# Patient Record
Sex: MALE | Race: WHITE | NOT HISPANIC OR LATINO | Employment: OTHER | URBAN - METROPOLITAN AREA
[De-identification: names, ages, dates, MRNs, and addresses within clinical notes are randomized per-mention and may not be internally consistent; named-entity substitution may affect disease eponyms.]

---

## 2017-01-03 ENCOUNTER — DOCUMENTATION (OUTPATIENT)
Dept: CARDIAC REHAB | Facility: HOSPITAL | Age: 67
End: 2017-01-03

## 2017-01-03 NOTE — PROGRESS NOTES
Pt. Referred for Phase II Cardiac Rehab. Patient to follow up with [facility] after discharge.  [facility] to refer pt back to Cardiac Rehab when appropriate.

## 2017-01-09 ENCOUNTER — LAB (OUTPATIENT)
Dept: LAB | Facility: HOSPITAL | Age: 67
End: 2017-01-09

## 2017-01-09 ENCOUNTER — OFFICE VISIT (OUTPATIENT)
Dept: CARDIOLOGY | Facility: CLINIC | Age: 67
End: 2017-01-09

## 2017-01-09 DIAGNOSIS — I25.5 CARDIOMYOPATHY, ISCHEMIC: ICD-10-CM

## 2017-01-09 DIAGNOSIS — I25.5 ISCHEMIC CARDIOMYOPATHY: ICD-10-CM

## 2017-01-09 DIAGNOSIS — I25.5 ISCHEMIC CARDIOMYOPATHY: Primary | ICD-10-CM

## 2017-01-09 LAB
ANION GAP SERPL CALCULATED.3IONS-SCNC: 4 MMOL/L (ref 3–11)
BUN BLD-MCNC: 13 MG/DL (ref 9–23)
BUN/CREAT SERPL: 13 (ref 7–25)
CALCIUM SPEC-SCNC: 9.3 MG/DL (ref 8.7–10.4)
CHLORIDE SERPL-SCNC: 106 MMOL/L (ref 99–109)
CO2 SERPL-SCNC: 35 MMOL/L (ref 20–31)
CREAT BLD-MCNC: 1 MG/DL (ref 0.6–1.3)
DEPRECATED RDW RBC AUTO: 42.7 FL (ref 37–54)
ERYTHROCYTE [DISTWIDTH] IN BLOOD BY AUTOMATED COUNT: 13.3 % (ref 11.3–14.5)
GFR SERPL CREATININE-BSD FRML MDRD: 75 ML/MIN/1.73
GLUCOSE BLD-MCNC: 69 MG/DL (ref 70–100)
HCT VFR BLD AUTO: 41 % (ref 38.9–50.9)
HGB BLD-MCNC: 13.9 G/DL (ref 13.1–17.5)
INR PPP: 0.89
MCH RBC QN AUTO: 30 PG (ref 27–31)
MCHC RBC AUTO-ENTMCNC: 33.9 G/DL (ref 32–36)
MCV RBC AUTO: 88.4 FL (ref 80–99)
PLATELET # BLD AUTO: 180 10*3/MM3 (ref 150–450)
PMV BLD AUTO: 10.1 FL (ref 6–12)
POTASSIUM BLD-SCNC: 5 MMOL/L (ref 3.5–5.5)
PROTHROMBIN TIME: 9.7 SECONDS (ref 9.6–11.5)
RBC # BLD AUTO: 4.64 10*6/MM3 (ref 4.2–5.76)
SODIUM BLD-SCNC: 145 MMOL/L (ref 132–146)
WBC NRBC COR # BLD: 13.08 10*3/MM3 (ref 3.5–10.8)

## 2017-01-09 PROCEDURE — 85610 PROTHROMBIN TIME: CPT | Performed by: PHYSICIAN ASSISTANT

## 2017-01-09 PROCEDURE — 36415 COLL VENOUS BLD VENIPUNCTURE: CPT

## 2017-01-09 PROCEDURE — 80048 BASIC METABOLIC PNL TOTAL CA: CPT | Performed by: INTERNAL MEDICINE

## 2017-01-09 PROCEDURE — 85027 COMPLETE CBC AUTOMATED: CPT | Performed by: PHYSICIAN ASSISTANT

## 2017-01-09 PROCEDURE — 99024 POSTOP FOLLOW-UP VISIT: CPT | Performed by: INTERNAL MEDICINE

## 2017-01-09 NOTE — PROGRESS NOTES
WOUND CHECK    2017    Cholo Lion, : 1950    WOUND CHECK    B/P: 118/78 (Sitting)    Pulse: 59    Patient has fever: [] Temperature if indicated: 97.8    Wound Location: Left Infraclavicular    Dressing Removed [x]        Old Dressing Appearance:  Clean, dry []                 Old, bloody drainage [x]                            Moist, serous drainage []                Moist, thick yellow/green drainage []       Wound Appearance: Redness []                  Drainage []                  Culture obtained []        Color: N/A     Consistency: N/A     Amount: none         Gloves used, wound cleansed with sterile 4x4 and peroxide [x]       MD notified [] MD orders:     Antibiotic started []  If checked, type   Other:     Appointment for follow-up scheduled for 3 months post procedure [x]    Future Appointments  Date Time Provider Department Center   5/15/2017 10:45 AM Espinoza Benjamin DO [facility] None           Nazia Leigh, 17      MD Signature:______________________________ Completed By/Date:

## 2017-03-20 ENCOUNTER — OFFICE VISIT (OUTPATIENT)
Dept: CARDIOLOGY | Facility: CLINIC | Age: 67
End: 2017-03-20

## 2017-03-20 VITALS
WEIGHT: 213.2 LBS | BODY MASS INDEX: 30.52 KG/M2 | HEART RATE: 45 BPM | HEIGHT: 70 IN | DIASTOLIC BLOOD PRESSURE: 64 MMHG | SYSTOLIC BLOOD PRESSURE: 116 MMHG

## 2017-03-20 DIAGNOSIS — I25.5 ISCHEMIC CARDIOMYOPATHY: Primary | ICD-10-CM

## 2017-03-20 DIAGNOSIS — I49.3 PVC'S (PREMATURE VENTRICULAR CONTRACTIONS): ICD-10-CM

## 2017-03-20 DIAGNOSIS — I49.3 PVC'S (PREMATURE VENTRICULAR CONTRACTIONS): Primary | ICD-10-CM

## 2017-03-20 PROCEDURE — 93289 INTERROG DEVICE EVAL HEART: CPT | Performed by: INTERNAL MEDICINE

## 2017-03-20 PROCEDURE — 99024 POSTOP FOLLOW-UP VISIT: CPT | Performed by: INTERNAL MEDICINE

## 2017-03-20 NOTE — PROGRESS NOTES
Subjective:   Cholo Lion  1950  [phone]      03/20/2017    [facility] CARDIOLOGY    Sebastian Najera MD  [address] [address]    REFERRING DOCTOR: Prabhjot Lala MD       Patient ID: Cholo Lion is a 66 y.o. male.    Chief Complaint:   Chief Complaint   Patient presents with   • Cardiomyopathy     Problem List:     1. CAD s/p previous CABG and history of stents in the past  [facility] 8/2016 [facility] Dr Lala patent LM and Lcx stent and patent LIMA graft  EF 20%  On optimal medical therapy  RBBB with  msec  Echo 12/2/2016 EF 20%  FC III symptoms on optimal medical therapy he can tolerate.      2. COPD     3. Tobacco abuse     4. HTN     5. HPL    Allergies   Allergen Reactions   • Bactrim [Sulfamethoxazole-Trimethoprim]      UNKNOWN   • Lopressor [Metoprolol Tartrate]      hypotension   • Neosporin [Neomycin-Bacitracin Zn-Polymyx] Rash       Current Outpatient Prescriptions:   •  aspirin 325 MG tablet, Take 325 mg by mouth Every Morning., Disp: , Rfl:   •  atorvastatin (LIPITOR) 20 MG tablet, Take 1 tablet by mouth daily. (Patient taking differently: Take 20 mg by mouth Every Morning.), Disp: 30 tablet, Rfl: 11  •  carvedilol (COREG) 3.125 MG tablet, Take 1 tablet by mouth every 12 (twelve) hours., Disp: 60 tablet, Rfl: 12  •  clopidogrel (PLAVIX) 75 MG tablet, Take 1 tablet by mouth daily. (Patient taking differently: Take 75 mg by mouth Every Morning.), Disp: 30 tablet, Rfl: 12  •  nitroglycerin (NITROSTAT) 0.4 MG SL tablet, Place 1 tablet under the tongue every 5 (five) minutes as needed for chest pain for up to 3 doses. Take no more than 3 doses in 15 minutes., Disp: 25 tablet, Rfl: 12  •  sacubitril-valsartan (ENTRESTO) 24-26 MG tablet, Take 1 tablet by mouth 2 (Two) Times a Day., Disp: 180 tablet, Rfl: 1  •  spironolactone (ALDACTONE) 25 MG tablet, Take 1 tablet by mouth daily. (Patient taking differently: Take 25 mg by mouth Every  "Morning.), Disp: 30 tablet, Rfl: 12    History of Present Illness    Patient presents today in follow-up s/p BIV ICD for ICM. He has not noticed any change in how he feels since implant. Still exhibiting FC III HF symptoms. He is also reporting frequent dizziness and near syncope. No issues with chest pain, fevers, chills, night sweats, PND, orthopnea or palpitations. No recent ER visits or hospital stays.  Seems to have good amount of PVCs even on physical exam.      The following portions of the patient's history were reviewed and updated as appropriate: allergies, current medications, past family history, past medical history, past social history, past surgical history and problem list.    ROS   14 point ROS negative except as outlined in problem list, HPI and other parts of the note.      ECG 12 Lead  Date/Time: 3/20/2017 3:54 PM  Performed by: NICOL MCALLISTER  Authorized by: NICOL MCALLISTER   Rhythm: sinus rhythm and paced  Ectopy: multifocal PVCs  Rate: normal  BPM: 75  Conduction: conduction normal  ST Segments: ST segments normal  QRS axis: normal  Other: no other findings  Clinical impression: non-specific ECG         PVCs is a right bundle and left bundle branch block 1 with a superior axis. Total of 4 PVCs       Objective:       Vitals:    03/20/17 1439   BP: 116/64   BP Location: Right arm   Patient Position: Sitting   Pulse: (!) 45   Weight: 213 lb 3.2 oz (96.7 kg)   Height: 70\" (177.8 cm)       GENERAL: Well-developed, well-nourished patient in no acute distress.  HEENT: Normocephalic, atraumatic, PERRLA. Moist mucous membranes.  NECK: No JVD present at 30°. No carotid bruits auscultated.  LUNGS: Clear to auscultation. Some decrease at the bases  CARDIOVASCULAR: Heart has a regular rate and rhythm. No murmurs, gallops or rubs noted. + ectopy  ABDOMEN: Soft, nontender. Positive bowel sounds.  MUSCULOSKELETAL: No gross deformities. No clubbing, cyanosis, or lower extremity edema.  SKIN: Pink, warm  Neuro: " Nonfocal exam. Gait intact  Ext: No edema or bruising    The patient's old records including ambulatory rhythm recordings (ECGs, Holter/event monitor) were reviewed and discussed.      Lab Review:   Results for orders placed or performed in visit on 01/09/17   CBC (No diff)   Result Value Ref Range    WBC 13.08 (H) 3.50 - 10.80 10*3/mm3    RBC 4.64 4.20 - 5.76 10*6/mm3    Hemoglobin 13.9 13.1 - 17.5 g/dL    Hematocrit 41.0 38.9 - 50.9 %    MCV 88.4 80.0 - 99.0 fL    MCH 30.0 27.0 - 31.0 pg    MCHC 33.9 32.0 - 36.0 g/dL    RDW 13.3 11.3 - 14.5 %    RDW-SD 42.7 37.0 - 54.0 fl    MPV 10.1 6.0 - 12.0 fL    Platelets 180 150 - 450 10*3/mm3   Protime-INR   Result Value Ref Range    Protime 9.7 9.6 - 11.5 Seconds    INR 0.89    Basic Metabolic Panel   Result Value Ref Range    Glucose 69 (L) 70 - 100 mg/dL    BUN 13 9 - 23 mg/dL    Creatinine 1.00 0.60 - 1.30 mg/dL    Sodium 145 132 - 146 mmol/L    Potassium 5.0 3.5 - 5.5 mmol/L    Chloride 106 99 - 109 mmol/L    CO2 35.0 (H) 20.0 - 31.0 mmol/L    Calcium 9.3 8.7 - 10.4 mg/dL    eGFR Non African Amer 75 >60 mL/min/1.73    BUN/Creatinine Ratio 13.0 7.0 - 25.0    Anion Gap 4.0 3.0 - 11.0 mmol/L     Device Interrogation: Normal functioning BIV ICD. BIV pacing 77%. A paced 39%. Stable thresholds and impedences. 11% PVCs. No AMS.       Diagnosis:   1. Ischemic cardiomyopathy    2. PVC's (premature ventricular contractions)    Assessment & Plan:     1. ICM w/ EF 20%, RBBB, and FC III symptoms s/p BIV ICD with normal function.  Functional class 2-3 at the present time.  Minimal change from prior to his biventricular ICD implantation.    2. Frequent PVCs- occurring at a rate of 11% and only BIV pacing 77%. Will discontinue Coreg today and start Sotalol 80mg BID. Normal Cr. Check EKG on Wednesday. If BP unable to tolerate, will have to place on amiodarone.  Patient will send a remote monitor check in 2-3 weeks to see how much he is active biventricular pacing and also see PVC  burden.  If sotalol not affective than may need to use amiodarone.  Consider EP study plus or minus ablation as last resort since even on this EKG patient has at least 2 different morphologies.  Also needs an EKG faxed to our office in 2 days.    3.  Tobacco abuse now down to about one pack per day.  Long discussion about cessation of smoking.  He did not have true COPD diagnosed.    Scribed for Espinoza Benjamin DO by Rocío Hurt PA-C. 3/20/2017  3:46 PM      LUCIANA Sargent  03/20/17  3:46 PM    I, Espinoza Benjamin DO, personally performed the services described in this documentation as scribed by the above named individual in my presence, and it is both accurate and complete.  3/20/2017  4:02 PM    Espinoza Benjamin DO  4:02 PM  03/20/17          EMR Dragon/Transcription disclaimer:  Much of this encounter note is an electronic transcription/translation of spoken language to printed text. Electronic translation of spoken language may permit erroneous, or at times, nonsensical words or phrases to be inadvertently transcribed. Although I have reviewed the note for such errors, some may still exist.

## 2017-03-21 ENCOUNTER — DOCUMENTATION (OUTPATIENT)
Dept: CARDIOLOGY | Facility: CLINIC | Age: 67
End: 2017-03-21

## 2017-04-05 ENCOUNTER — TELEPHONE (OUTPATIENT)
Dept: CARDIOLOGY | Facility: CLINIC | Age: 67
End: 2017-04-05

## 2017-04-05 NOTE — TELEPHONE ENCOUNTER
Patient's wife called to let you know that since you started the patient on Sotalol, he has not been feeling well. She said that his BP and HR has been normal, but he is extremely tired and is having severe stomach pain. She just did not think this medication was agreeing with him.

## 2017-07-26 ENCOUNTER — CLINICAL SUPPORT NO REQUIREMENTS (OUTPATIENT)
Dept: CARDIOLOGY | Facility: CLINIC | Age: 67
End: 2017-07-26

## 2017-07-26 DIAGNOSIS — I25.5 ISCHEMIC CARDIOMYOPATHY: Primary | ICD-10-CM

## 2017-07-26 PROCEDURE — 93296 REM INTERROG EVL PM/IDS: CPT | Performed by: INTERNAL MEDICINE

## 2017-07-26 PROCEDURE — 93295 DEV INTERROG REMOTE 1/2/MLT: CPT | Performed by: INTERNAL MEDICINE

## 2017-11-08 ENCOUNTER — CLINICAL SUPPORT NO REQUIREMENTS (OUTPATIENT)
Dept: CARDIOLOGY | Facility: CLINIC | Age: 67
End: 2017-11-08

## 2017-11-08 DIAGNOSIS — I25.5 ISCHEMIC CARDIOMYOPATHY: Primary | ICD-10-CM

## 2017-11-08 PROCEDURE — 93296 REM INTERROG EVL PM/IDS: CPT | Performed by: INTERNAL MEDICINE

## 2017-11-08 PROCEDURE — 93295 DEV INTERROG REMOTE 1/2/MLT: CPT | Performed by: INTERNAL MEDICINE

## 2018-02-05 ENCOUNTER — OFFICE VISIT (OUTPATIENT)
Dept: CARDIOLOGY | Facility: CLINIC | Age: 68
End: 2018-02-05

## 2018-02-05 VITALS
WEIGHT: 183.2 LBS | DIASTOLIC BLOOD PRESSURE: 76 MMHG | SYSTOLIC BLOOD PRESSURE: 124 MMHG | HEIGHT: 70 IN | BODY MASS INDEX: 26.23 KG/M2 | HEART RATE: 83 BPM

## 2018-02-05 DIAGNOSIS — I25.5 ISCHEMIC CARDIOMYOPATHY: Primary | ICD-10-CM

## 2018-02-05 DIAGNOSIS — I49.3 PVC'S (PREMATURE VENTRICULAR CONTRACTIONS): ICD-10-CM

## 2018-02-05 DIAGNOSIS — I10 ESSENTIAL HYPERTENSION: ICD-10-CM

## 2018-02-05 PROCEDURE — 93284 PRGRMG EVAL IMPLANTABLE DFB: CPT | Performed by: PHYSICIAN ASSISTANT

## 2018-02-05 PROCEDURE — 99214 OFFICE O/P EST MOD 30 MIN: CPT | Performed by: PHYSICIAN ASSISTANT

## 2018-02-05 NOTE — PROGRESS NOTES
[place] [facility] - Office Note  Cholo Lion         [address] [place] [address]  1950   [phone] (home)      LOCATION:  [place] office.  Visit Type: Follow Up.    PCP:  PIOTR Moran   Primary Cardiologist:  Dr. Prabhjot Lala    02/05/18   Cholo Lion is a 67 y.o.  male  retired.      Chief Complaint: FU on ICM, HTN, HLP    Problem List:    1. CAD   A.   s/p previous CABG and history of stents in the past;idb.   B.  [facility] 8/2016 [facility] Dr Lala: patent LM and Lcx stent and patent LIMA graft   C.  EF 20%, On optimal medical therapy   D.  RBBB with  msec   E.  Echo 12/2/2016 EF 20% with functional class III symptoms on optimal medical therapy he can tolerate.      F.  [employer]. Chris BiV ICD  2. COPD  3. Tobacco abuse  4. Essential HTN  5. Hyperlipidemia  6.  PVCs  7.  Colon Cancer   A. Hospitalized June - August 2017.   B.  S/P Partial colectomy.  NO chemo, NO radiation.  Followed by [facility].      Allergies   Allergen Reactions   • Bactrim [Sulfamethoxazole-Trimethoprim]      UNKNOWN   • Lopressor [Metoprolol Tartrate]      hypotension   • Neosporin [Neomycin-Bacitracin Zn-Polymyx] Rash         Current Outpatient Prescriptions:   •  aspirin 325 MG tablet, Take 325 mg by mouth Every Morning., Disp: , Rfl:   •  atorvastatin (LIPITOR) 20 MG tablet, Take 1 tablet by mouth daily. (Patient taking differently: Take 20 mg by mouth Every Morning.), Disp: 30 tablet, Rfl: 11  •  carvedilol (COREG) 3.125 MG tablet, Take 1 tablet by mouth every 12 (twelve) hours., Disp: 60 tablet, Rfl: 12  •  clopidogrel (PLAVIX) 75 MG tablet, Take 1 tablet by mouth daily. (Patient taking differently: Take 75 mg by mouth Every Morning.), Disp: 30 tablet, Rfl: 12  •  nitroglycerin (NITROSTAT) 0.4 MG SL tablet, Place 1 tablet under the tongue every 5 (five) minutes as needed for chest pain for up to 3 doses. Take no more  than 3 doses in 15 minutes., Disp: 25 tablet, Rfl: 12  •  Sotalol HCl AF 80 MG tablet, Take 1 tablet by mouth 2 (Two) Times a Day., Disp: 60 tablet, Rfl: 6 - please note, patient is no longer taking this medication.  •  spironolactone (ALDACTONE) 25 MG tablet, Take 1 tablet by mouth daily. (Patient taking differently: Take 25 mg by mouth Every Morning.), Disp: 30 tablet, Rfl: 12    HPI    Mr. Lion is a 67-year-old  male with the noted above history who is here today for routine follow-up on his ischemic cardiopathy myopathy with a bi-V ICD, controlled hypertension and long-standing PVCs.  From a cardiac standpoint he is doing well.  He's had a rough year with medication changes and most recently diagnosis of colon cancer this past summer.  He did undergo surgical intervention for this without the need of chemotherapy or radiation.  He has lost a lot of weight with the surgery and had a rough hospital course.    He denies chest pain or exertional angina.  He has chronic dyspnea and generalized fatigue.  He denies any lower extremity edema, denies palpitations or presyncopal episodes.  He does follow routinely with his oncologist, his PCP, and his primary cardiologist Dr. Lala.        The following portions of the patient's history were reviewed in the chart and updated as appropriate: allergies, current medications, past family history, past medical history, past social history, past surgical history and problem list.    Review of Systems   Constitution: Positive for weakness and weight loss. Negative for malaise/fatigue and night sweats.   Cardiovascular: Negative for chest pain, dyspnea on exertion, irregular heartbeat, leg swelling and palpitations.   Respiratory: Positive for shortness of breath. Negative for cough.    Hematologic/Lymphatic: Negative for bleeding problem. Does not bruise/bleed easily.   Gastrointestinal: Positive for abdominal pain.   All other systems reviewed and are negative.    "         height is 177.8 cm (70\") and weight is 83.1 kg (183 lb 3.2 oz). His blood pressure is 124/76 and his pulse is 83.   Physical Exam   Constitutional: Vital signs are normal. He appears well-developed and well-nourished.   Cardiovascular: Normal rate, regular rhythm, S1 normal, S2 normal, normal heart sounds, intact distal pulses and normal pulses.    Pulmonary/Chest: Effort normal. He has wheezes in the right upper field. He has no rhonchi. He has no rales.   Abdominal: Soft. Normal appearance and bowel sounds are normal. There is no hepatosplenomegaly.   Neurological: He is alert.           ECG 12 Lead  Date/Time: 2/5/2018 2:53 PM  Performed by: KENDRA HOOKER  Authorized by: KENDRA HOOKER   Comparison: compared with previous ECG from 3/20/2017  Similar to previous ECG  Rhythm: sinus rhythm and paced  Ectopy: PVCs  Rate: normal  Conduction: right bundle branch block  QRS axis: normal  Clinical impression: abnormal ECG           [employer]. Chris bi-V ICD: Atrial pacing 29%, P-wave 3, threshold 0.5, impedance 510 ohms.  Bi-V pacing 90% with an R-wave of 12.  RV threshold 0.5 RV impedance 390 ohms.  LV threshold 0.75, LV impedance 1000 ohms.  Battery voltage is at 84% which is approximately 6.1 years.  Charge time 9.3 seconds.  He's had less than 1% mode switching but no atrial fibrillation.  7.3% PVCs  Assessment/ Plan   Ischemic cardiomyopathy:  Stable, well compensated.  I would continue with all current medical therapies.  Return to clinic 6-8 months with repeat St. Chris check and EKG or sooner when necessary.  He does follow routinely with Dr. Lala, his primary cardiologist    PVC's (premature ventricular contractions):  Percentage of PVC noted.  EKG reviewed and performed.  At this time I would make no changes.  He did not tolerate the sotalol.  There has been discussion with the patient and the wife in the past about possible evaluation for PVC ablation.  However, patient appears to be well compensated " and asymptomatic at this time.  I would continue to monitor and follow-up accordingly.    Essential hypertension:  Well-controlled on current medical regimen.  No changes made today.      Mayra Garcia PA-C functioning independently.  2/5/2018 3:10 PM      EMR Dragon/Transcription disclaimer:   Much of this encounter note is an electronic transcription/translation of spoken language to printed text. The electronic translation of spoken language may permit erroneous, or at times, nonsensical words or phrases to be inadvertently transcribed; Although I have reviewed the note for such errors, some may still exist.

## 2018-05-15 ENCOUNTER — CLINICAL SUPPORT NO REQUIREMENTS (OUTPATIENT)
Dept: CARDIOLOGY | Facility: CLINIC | Age: 68
End: 2018-05-15

## 2018-05-15 DIAGNOSIS — I49.3 PVC'S (PREMATURE VENTRICULAR CONTRACTIONS): ICD-10-CM

## 2018-05-15 DIAGNOSIS — I25.5 ISCHEMIC CARDIOMYOPATHY: ICD-10-CM

## 2018-05-15 PROCEDURE — 93296 REM INTERROG EVL PM/IDS: CPT | Performed by: INTERNAL MEDICINE

## 2018-05-15 PROCEDURE — 93295 DEV INTERROG REMOTE 1/2/MLT: CPT | Performed by: INTERNAL MEDICINE

## 2018-08-20 ENCOUNTER — OFFICE VISIT (OUTPATIENT)
Dept: CARDIOLOGY | Facility: CLINIC | Age: 68
End: 2018-08-20

## 2018-08-20 VITALS
WEIGHT: 205.6 LBS | SYSTOLIC BLOOD PRESSURE: 92 MMHG | DIASTOLIC BLOOD PRESSURE: 64 MMHG | BODY MASS INDEX: 31.16 KG/M2 | HEART RATE: 70 BPM | HEIGHT: 68 IN

## 2018-08-20 DIAGNOSIS — I25.5 ISCHEMIC CARDIOMYOPATHY: ICD-10-CM

## 2018-08-20 DIAGNOSIS — I25.10 CORONARY ARTERY DISEASE INVOLVING NATIVE CORONARY ARTERY OF NATIVE HEART WITHOUT ANGINA PECTORIS: Primary | ICD-10-CM

## 2018-08-20 DIAGNOSIS — I10 ESSENTIAL HYPERTENSION: ICD-10-CM

## 2018-08-20 DIAGNOSIS — I49.3 PVC'S (PREMATURE VENTRICULAR CONTRACTIONS): ICD-10-CM

## 2018-08-20 PROCEDURE — 99213 OFFICE O/P EST LOW 20 MIN: CPT | Performed by: NURSE PRACTITIONER

## 2018-08-20 PROCEDURE — 93284 PRGRMG EVAL IMPLANTABLE DFB: CPT | Performed by: NURSE PRACTITIONER

## 2018-08-20 NOTE — PROGRESS NOTES
Subjective:   Cholo Lion  1950    There is no work phone number on file.    08/20/2018    [facility] CARDIOLOGY    Mayra Brewer, APRN  [address] [phone]      Patient ID: Cholo Lion is a 68 y.o. male.    Chief Complaint:   Chief Complaint   Patient presents with   • Cardiomyopathy     Problem List:  1. CAD              A.   s/p previous CABG and history of stents in the past;idb.              B.  [facility] 8/2016 [facility] Dr Lala: patent LM and Lcx stent and patent LIMA graft              C.  EF 20%, On optimal medical therapy              D.  RBBB with  msec              E.  Echo 12/2/2016 EF 20% with functional class III symptoms on optimal medical therapy he can tolerate.               F.  [employer]. [employer] BiV ICD  2. COPD  3. Tobacco abuse  4. Essential HTN  5. Hyperlipidemia  6.  PVCs  7.  Colon Cancer              A. Hospitalized June - August 2017.              B.  S/P Partial colectomy.  No chemo, No radiation.  Followed by [facility].    Allergies   Allergen Reactions   • Bactrim [Sulfamethoxazole-Trimethoprim]      UNKNOWN   • Lopressor [Metoprolol Tartrate]      hypotension   • Neosporin [Neomycin-Bacitracin Zn-Polymyx] Rash       Current Outpatient Prescriptions:   •  aspirin 325 MG tablet, Take 325 mg by mouth Every Morning., Disp: , Rfl:   •  atorvastatin (LIPITOR) 20 MG tablet, Take 1 tablet by mouth daily. (Patient taking differently: Take 20 mg by mouth Every Morning.), Disp: 30 tablet, Rfl: 11  •  carvedilol (COREG) 3.125 MG tablet, Take 1 tablet by mouth every 12 (twelve) hours., Disp: 60 tablet, Rfl: 12  •  clopidogrel (PLAVIX) 75 MG tablet, Take 1 tablet by mouth daily. (Patient taking differently: Take 75 mg by mouth Every Morning.), Disp: 30 tablet, Rfl: 12  •  nitroglycerin (NITROSTAT) 0.4 MG SL tablet, Place 1 tablet under the tongue every 5 (five) minutes as needed for chest pain for up to 3  "doses. Take no more than 3 doses in 15 minutes., Disp: 25 tablet, Rfl: 12  •  spironolactone (ALDACTONE) 25 MG tablet, Take 1 tablet by mouth daily. (Patient taking differently: Take 25 mg by mouth Every Morning.), Disp: 30 tablet, Rfl: 12   Entresto     History of Present Illness: Mr. Lion is a 67-year-old  male with the noted above history who is here today for routine follow-up on his ischemic cardiomyopathy, s/p bi-V ICD, and PVCs.  He is followed routinely by Dr. Lala. From a cardiac standpoint, he continues to do well.  No issues with chest pain, shortness of breath, fevers, chills, night sweats, PND, orthopnea or palpitations. No recent ER visits or hospital stays. Followed by Dr. Lala. Saw him last around June. Taken off Sotalol for unclear reasons. 7% PVC burden which is similar as before. BP prevents further up titration of BB. No other issues.      The following portions of the patient's history were reviewed and updated as appropriate: allergies, current medications, past family history, past medical history, past social history, past surgical history and problem list.    ROS   14 point ROS negative except as outlined in problem list, HPI and other parts of the note.      ECG 12 Lead  Date/Time: 8/20/2018 10:17 AM  Performed by: CHAD VALIENTE  Authorized by: CHAD VALIENTE   Comparison: compared with previous ECG from 2/5/2018  Rhythm: paced  BPM: 70                 Objective:       Vitals:    08/20/18 1003   BP: 92/64   BP Location: Left arm   Patient Position: Sitting   Pulse: 70   Weight: 93.3 kg (205 lb 9.6 oz)   Height: 172.7 cm (68\")     Body mass index is 31.26 kg/m².    Physical Exam:  GENERAL: Well-developed, well-nourished patient in no acute distress.  HEENT: Normocephalic, atraumatic, PERRLA. Moist mucous membranes.  NECK: No JVD present at 30°. No carotid bruits auscultated.  LUNGS: Clear to auscultation. Non labored.   CARDIOVASCULAR: Regular rate and " rhythm. No murmurs, gallops or rubs noted.   ABDOMEN: Soft, nontender. Non-distended. positive bowel sounds.  MUSCULOSKELETAL: No gross deformities. No clubbing, cyanosis, or lower extremity edema.  SKIN: Pink, warm. ICD site- no issues.   Neuro: No gross neurologic deficits  Ext: No edema or bruising    The patient's old records including ambulatory rhythm recordings (ECGs, Holter/event monitor) were reviewed and discussed.      Lab Review:   Results for orders placed or performed in visit on 01/09/17   CBC (No diff)   Result Value Ref Range    WBC 13.08 (H) 3.50 - 10.80 10*3/mm3    RBC 4.64 4.20 - 5.76 10*6/mm3    Hemoglobin 13.9 13.1 - 17.5 g/dL    Hematocrit 41.0 38.9 - 50.9 %    MCV 88.4 80.0 - 99.0 fL    MCH 30.0 27.0 - 31.0 pg    MCHC 33.9 32.0 - 36.0 g/dL    RDW 13.3 11.3 - 14.5 %    RDW-SD 42.7 37.0 - 54.0 fl    MPV 10.1 6.0 - 12.0 fL    Platelets 180 150 - 450 10*3/mm3   Protime-INR   Result Value Ref Range    Protime 9.7 9.6 - 11.5 Seconds    INR 0.89    Basic Metabolic Panel   Result Value Ref Range    Glucose 69 (L) 70 - 100 mg/dL    BUN 13 9 - 23 mg/dL    Creatinine 1.00 0.60 - 1.30 mg/dL    Sodium 145 132 - 146 mmol/L    Potassium 5.0 3.5 - 5.5 mmol/L    Chloride 106 99 - 109 mmol/L    CO2 35.0 (H) 20.0 - 31.0 mmol/L    Calcium 9.3 8.7 - 10.4 mg/dL    eGFR Non African Amer 75 >60 mL/min/1.73    BUN/Creatinine Ratio 13.0 7.0 - 25.0    Anion Gap 4.0 3.0 - 11.0 mmol/L         Device check 8/20/18: RA  45%, BP 87%, normal threshold and impedances, batter y life 78%, no events, 7% PVC burden.   Diagnosis:   1. Ischemic cardiomyopathy  2. Coronary artery disease involving native coronary artery of native heart without angina pectoris  3. PVC's (premature ventricular contractions)  4. Essential hypertension  Assessment & Plan:   1) Ischemic cardiomyopathy,  s/p BiV ICD implant with normal function on today's device check. On optimal medical therapy with BB but no ACE/ARB for unclear reasons. NYHA class  II-III symptoms.   Continue current medications  Follow up with Dr. Lala as scheduled  Device check in 6 months    2) CAD, stable.   Continue current medications    3. PVC's, stable on Coreg. QTc acceptable.     3. HTN, controlled  Continue current medications         CC: PIOTR Singh  [facility]  8/20/2018  10:15 AM

## 2018-10-17 ENCOUNTER — CLINICAL SUPPORT NO REQUIREMENTS (OUTPATIENT)
Dept: CARDIOLOGY | Facility: CLINIC | Age: 68
End: 2018-10-17

## 2018-10-17 DIAGNOSIS — I25.5 ISCHEMIC CARDIOMYOPATHY: ICD-10-CM

## 2018-10-17 PROCEDURE — 93296 REM INTERROG EVL PM/IDS: CPT | Performed by: INTERNAL MEDICINE

## 2018-10-17 PROCEDURE — 93295 DEV INTERROG REMOTE 1/2/MLT: CPT | Performed by: INTERNAL MEDICINE

## 2019-01-16 ENCOUNTER — CLINICAL SUPPORT NO REQUIREMENTS (OUTPATIENT)
Dept: CARDIOLOGY | Facility: CLINIC | Age: 69
End: 2019-01-16

## 2019-01-16 DIAGNOSIS — I25.5 ISCHEMIC CARDIOMYOPATHY: ICD-10-CM

## 2019-01-16 PROCEDURE — 93295 DEV INTERROG REMOTE 1/2/MLT: CPT | Performed by: INTERNAL MEDICINE

## 2019-01-16 PROCEDURE — 93296 REM INTERROG EVL PM/IDS: CPT | Performed by: INTERNAL MEDICINE

## 2019-08-02 ENCOUNTER — OFFICE VISIT (OUTPATIENT)
Dept: NEUROSURGERY | Facility: CLINIC | Age: 69
End: 2019-08-02

## 2019-08-02 VITALS — RESPIRATION RATE: 15 BRPM | BODY MASS INDEX: 29.8 KG/M2 | HEIGHT: 68 IN | WEIGHT: 196.6 LBS

## 2019-08-02 DIAGNOSIS — Z72.0 TOBACCO ABUSE: ICD-10-CM

## 2019-08-02 DIAGNOSIS — M54.9 MECHANICAL BACK PAIN: Primary | ICD-10-CM

## 2019-08-02 PROCEDURE — 99214 OFFICE O/P EST MOD 30 MIN: CPT | Performed by: PHYSICIAN ASSISTANT

## 2019-08-02 NOTE — PROGRESS NOTES
Patient: Cholo Lion  : 1950    Primary Care Provider: Mayra Brewer APRN      Chief Complaint: Back pain    History of Present Illness:       Patient is a 69-year-old male who has been referred to us for back pain.  Patient was carrying an 83 pound box and fell forward with it and felt a pop in his back.  Initially he was in exquisite pain. patient has been worked up previously with CT scans of his abdomen and thoracic spine that both revealed minor endplate fractures of T12.  Patient was referred here for consideration of kyphoplasty.     Patient is doing 50% better and his overall pain and is getting around and mowing his yard.  I discussed with patient and wife that kyphoplasty would probably do very little for him considering he is ambulatory and seems to be getting better as time goes on.  Patient is currently 3 weeks post fall and I have recommended another 6 or 8 weeks of conservatism.  Patient continues to smoke heavily and this regards my attempts to encourage him to stop.    I spent greater than 10 minutes educating the patient on smoking cessation, and discussed how smoking pertains to the particular disease process at hand.  The patient acknowledges understanding.      Review of Systems   Constitutional: Positive for chills and fatigue. Negative for activity change, appetite change, diaphoresis, fever and unexpected weight change.   HENT: Positive for congestion, hearing loss, sinus pressure and tinnitus. Negative for dental problem, drooling, ear discharge, ear pain, facial swelling, mouth sores, nosebleeds, postnasal drip, rhinorrhea, sneezing, sore throat, trouble swallowing and voice change.    Eyes: Negative for photophobia, pain, discharge, redness, itching and visual disturbance.   Respiratory: Negative for apnea, cough, choking, chest tightness, shortness of breath, wheezing and stridor.    Cardiovascular: Negative for chest pain, palpitations and leg swelling.    Gastrointestinal: Negative for abdominal distention, abdominal pain, anal bleeding, blood in stool, constipation, diarrhea, nausea, rectal pain and vomiting.   Endocrine: Negative for cold intolerance, heat intolerance, polydipsia, polyphagia and polyuria.   Genitourinary: Positive for flank pain. Negative for decreased urine volume, difficulty urinating, dysuria, enuresis, frequency, genital sores, hematuria and urgency.   Musculoskeletal: Positive for back pain. Negative for arthralgias, gait problem, joint swelling, myalgias, neck pain and neck stiffness.   Skin: Negative for color change, pallor, rash and wound.   Allergic/Immunologic: Negative for environmental allergies, food allergies and immunocompromised state.   Neurological: Negative for dizziness, tremors, seizures, syncope, facial asymmetry, speech difficulty, weakness, light-headedness, numbness and headaches.   Hematological: Negative for adenopathy. Does not bruise/bleed easily.   Psychiatric/Behavioral: Negative for agitation, behavioral problems, confusion, decreased concentration, dysphoric mood, hallucinations, self-injury, sleep disturbance and suicidal ideas. The patient is not nervous/anxious and is not hyperactive.    All other systems reviewed and are negative.      Past Medical History:     Past Medical History:   Diagnosis Date   • Bundle branch block, right    • Cancer (CMS/HCC)    • Cardiomyopathy (CMS/HCC)    • Chest pain    • COPD (chronic obstructive pulmonary disease) (CMS/HCC) 12/19/2016   • Coronary artery disease    • Heart attack (CMS/HCC) 1993   • History of shingles    • Hyperlipidemia    • Hypertension    • Lung nodules 12/19/2016       Family History:     Family History   Problem Relation Age of Onset   • Heart attack Brother    • No Known Problems Mother    • No Known Problems Father    • Clotting disorder Sister         clot traveled to heart       Social History:    reports that he has been smoking cigarettes.  He has  "been smoking about 1.00 pack per day. He has never used smokeless tobacco. He reports that he drinks alcohol. He reports that he does not use drugs.   SMOKING STATUS: I spent greater than 10 minutes educating the patient on smoking cessation, and discussed how smoking pertains to the particular disease process at hand.  The patient acknowledges understanding.      Surgical History:     Past Surgical History:   Procedure Laterality Date   • BIVENTRICULLAR IMPLANTABLE CARDIOVERTER DEFIBRILLATOR PLACEMENT     • CARDIAC CATHETERIZATION N/A 8/10/2016    Procedure: Left Heart Cath;  Surgeon: Prabhjot Lala MD;  Location:  [facility];  Service:    • CARDIAC ELECTROPHYSIOLOGY PROCEDURE N/A 12/30/2016    Procedure: Implant ICD - bi ventricular;  Surgeon: Espinoza Benjamin DO;  Location:  [facility];  Service:    • COLON SURGERY  06/26/2016   • CORONARY ARTERY BYPASS GRAFT         Allergies:   Bactrim [sulfamethoxazole-trimethoprim]; Lopressor [metoprolol tartrate]; and Neosporin [neomycin-bacitracin zn-polymyx]    Physical Exam:    Vital Signs:Resp 15   Ht 172.7 cm (68\")   Wt 89.2 kg (196 lb 9.6 oz)   BMI 29.89 kg/m²    BMI: Body mass index is 29.89 kg/m².    GENERAL:   The patient is in no acute distress, and is able to answer all questions appropriately.    Musculoskeletal:     strength is 5 out of 5 bilaterally.   Shoulder abduction is 5 out of 5.    Dorsiflexion is 5/5 Bilaterally  Plantarflexion is 5/5 bilaterally  Hip Flexion 5/5 bilaterally.    The patient´s gait is normal without antalgia.  No percussive tenderness  Neurologic:   The patient is alert and oriented by 3.     Sensation is equal bilaterally with no deficit.      Reflexes:  2+ @ biceps, triceps, brachioradialis, as well as the patellar and Achilles tendon bilaterally.      Medical Decision Making    Data Review:   CT of the lumbar spine revealed a small superior endplate fracture with about 10% loss of height " at the T12 bone    Diagnosis:   Compression fracture T12  COPD   Tobacco abuse  CABG  Right bundle branch block  Ardie myopathy  Nodules  Ischemic cardiomyopathy  Colon cancer    Treatment Options:   There is little we can offer the patient.  Patient has a very extensive medical history and continues to smoke disregarding physicians wishes.  Patient has a small crack in his T12 bone that is not likely going to give him further issues.  This will take some time to get better.  He has me for pain medicine I have denied him and he should acquire these from his primary care physician saying that we are not planning on doing any surgery on him.    Patient will follow with us on as-needed basis      No diagnosis found.

## 2019-11-12 ENCOUNTER — TRANSCRIBE ORDERS (OUTPATIENT)
Dept: ADMINISTRATIVE | Facility: HOSPITAL | Age: 69
End: 2019-11-12

## 2019-11-12 DIAGNOSIS — R94.39 ABNORMAL STRESS TEST: Primary | ICD-10-CM

## 2019-11-13 ENCOUNTER — HOSPITAL ENCOUNTER (OUTPATIENT)
Facility: HOSPITAL | Age: 69
Discharge: HOME | End: 2019-11-13
Attending: INTERNAL MEDICINE | Admitting: INTERNAL MEDICINE

## 2019-11-13 VITALS
TEMPERATURE: 97.3 F | HEIGHT: 70 IN | RESPIRATION RATE: 16 BRPM | OXYGEN SATURATION: 97 % | SYSTOLIC BLOOD PRESSURE: 132 MMHG | BODY MASS INDEX: 28.22 KG/M2 | DIASTOLIC BLOOD PRESSURE: 66 MMHG | WEIGHT: 197.09 LBS | HEART RATE: 69 BPM

## 2019-11-13 DIAGNOSIS — R94.39 ABNORMAL STRESS TEST: ICD-10-CM

## 2019-11-13 LAB
ANION GAP SERPL CALCULATED.3IONS-SCNC: 9 MMOL/L (ref 5–15)
BUN BLD-MCNC: 12 MG/DL (ref 8–23)
BUN BLDA-MCNC: 11 MG/DL (ref 8–26)
BUN/CREAT SERPL: 10.6 (ref 7–25)
CA-I BLDA-SCNC: 1.21 MMOL/L (ref 1.2–1.32)
CALCIUM SPEC-SCNC: 9 MG/DL (ref 8.6–10.5)
CHLORIDE BLDA-SCNC: 102 MMOL/L (ref 98–109)
CHLORIDE SERPL-SCNC: 103 MMOL/L (ref 98–107)
CO2 BLDA-SCNC: 27 MMOL/L (ref 24–29)
CO2 SERPL-SCNC: 26 MMOL/L (ref 22–29)
CREAT BLD-MCNC: 1.13 MG/DL (ref 0.76–1.27)
CREAT BLDA-MCNC: 1.3 MG/DL (ref 0.6–1.3)
DEPRECATED RDW RBC AUTO: 45.9 FL (ref 37–54)
ERYTHROCYTE [DISTWIDTH] IN BLOOD BY AUTOMATED COUNT: 13.5 % (ref 12.3–15.4)
GFR SERPL CREATININE-BSD FRML MDRD: 64 ML/MIN/1.73
GLUCOSE BLD-MCNC: 118 MG/DL (ref 65–99)
GLUCOSE BLDC GLUCOMTR-MCNC: 89 MG/DL (ref 70–130)
HCT VFR BLD AUTO: 42.1 % (ref 37.5–51)
HCT VFR BLDA CALC: 40 % (ref 38–51)
HGB BLD-MCNC: 13.3 G/DL (ref 13–17.7)
HGB BLDA-MCNC: 13.6 G/DL (ref 12–17)
MCH RBC QN AUTO: 29.2 PG (ref 26.6–33)
MCHC RBC AUTO-ENTMCNC: 31.6 G/DL (ref 31.5–35.7)
MCV RBC AUTO: 92.3 FL (ref 79–97)
PLATELET # BLD AUTO: 209 10*3/MM3 (ref 140–450)
PMV BLD AUTO: 10.1 FL (ref 6–12)
POTASSIUM BLD-SCNC: 4.4 MMOL/L (ref 3.5–5.2)
POTASSIUM BLDA-SCNC: 4.4 MMOL/L (ref 3.5–4.9)
RBC # BLD AUTO: 4.56 10*6/MM3 (ref 4.14–5.8)
SODIUM BLD-SCNC: 138 MMOL/L (ref 136–145)
SODIUM BLDA-SCNC: 139 MMOL/L (ref 138–146)
WBC NRBC COR # BLD: 8.86 10*3/MM3 (ref 3.4–10.8)

## 2019-11-13 PROCEDURE — 85014 HEMATOCRIT: CPT

## 2019-11-13 PROCEDURE — 80048 BASIC METABOLIC PNL TOTAL CA: CPT

## 2019-11-13 PROCEDURE — 0 IOPAMIDOL PER 1 ML: Performed by: INTERNAL MEDICINE

## 2019-11-13 PROCEDURE — 25010000002 MIDAZOLAM PER 1 MG: Performed by: INTERNAL MEDICINE

## 2019-11-13 PROCEDURE — 85027 COMPLETE CBC AUTOMATED: CPT

## 2019-11-13 PROCEDURE — 36415 COLL VENOUS BLD VENIPUNCTURE: CPT

## 2019-11-13 PROCEDURE — C1769 GUIDE WIRE: HCPCS | Performed by: INTERNAL MEDICINE

## 2019-11-13 PROCEDURE — C1894 INTRO/SHEATH, NON-LASER: HCPCS | Performed by: INTERNAL MEDICINE

## 2019-11-13 PROCEDURE — 25010000003 LIDOCAINE 1 % SOLUTION: Performed by: INTERNAL MEDICINE

## 2019-11-13 PROCEDURE — 93459 L HRT ART/GRFT ANGIO: CPT | Performed by: INTERNAL MEDICINE

## 2019-11-13 PROCEDURE — 25010000002 HEPARIN (PORCINE) PER 1000 UNITS: Performed by: INTERNAL MEDICINE

## 2019-11-13 PROCEDURE — 80047 BASIC METABLC PNL IONIZED CA: CPT

## 2019-11-13 PROCEDURE — 25010000002 FENTANYL CITRATE (PF) 100 MCG/2ML SOLUTION: Performed by: INTERNAL MEDICINE

## 2019-11-13 RX ORDER — LIDOCAINE HYDROCHLORIDE 10 MG/ML
INJECTION, SOLUTION INFILTRATION; PERINEURAL AS NEEDED
Status: DISCONTINUED | OUTPATIENT
Start: 2019-11-13 | End: 2019-11-13 | Stop reason: HOSPADM

## 2019-11-13 RX ORDER — ASPIRIN 325 MG
325 TABLET, DELAYED RELEASE (ENTERIC COATED) ORAL DAILY
Status: DISCONTINUED | OUTPATIENT
Start: 2019-11-13 | End: 2019-11-13 | Stop reason: HOSPADM

## 2019-11-13 RX ORDER — ACETAMINOPHEN 325 MG/1
650 TABLET ORAL EVERY 4 HOURS PRN
Status: DISCONTINUED | OUTPATIENT
Start: 2019-11-13 | End: 2019-11-13 | Stop reason: HOSPADM

## 2019-11-13 RX ORDER — NALOXONE HCL 0.4 MG/ML
0.4 VIAL (ML) INJECTION
Status: DISCONTINUED | OUTPATIENT
Start: 2019-11-13 | End: 2019-11-13 | Stop reason: HOSPADM

## 2019-11-13 RX ORDER — HYDROCODONE BITARTRATE AND ACETAMINOPHEN 5; 325 MG/1; MG/1
1 TABLET ORAL EVERY 4 HOURS PRN
Status: DISCONTINUED | OUTPATIENT
Start: 2019-11-13 | End: 2019-11-13 | Stop reason: HOSPADM

## 2019-11-13 RX ORDER — ALPRAZOLAM 0.25 MG/1
0.25 TABLET ORAL 3 TIMES DAILY PRN
Status: DISCONTINUED | OUTPATIENT
Start: 2019-11-13 | End: 2019-11-13 | Stop reason: HOSPADM

## 2019-11-13 RX ORDER — MIDAZOLAM HYDROCHLORIDE 1 MG/ML
INJECTION INTRAMUSCULAR; INTRAVENOUS AS NEEDED
Status: DISCONTINUED | OUTPATIENT
Start: 2019-11-13 | End: 2019-11-13 | Stop reason: HOSPADM

## 2019-11-13 RX ORDER — FENTANYL CITRATE 50 UG/ML
INJECTION, SOLUTION INTRAMUSCULAR; INTRAVENOUS AS NEEDED
Status: DISCONTINUED | OUTPATIENT
Start: 2019-11-13 | End: 2019-11-13 | Stop reason: HOSPADM

## 2019-11-13 RX ORDER — TEMAZEPAM 7.5 MG/1
7.5 CAPSULE ORAL NIGHTLY PRN
Status: DISCONTINUED | OUTPATIENT
Start: 2019-11-13 | End: 2019-11-13 | Stop reason: HOSPADM

## 2019-11-13 RX ORDER — MORPHINE SULFATE 2 MG/ML
1 INJECTION, SOLUTION INTRAMUSCULAR; INTRAVENOUS EVERY 4 HOURS PRN
Status: DISCONTINUED | OUTPATIENT
Start: 2019-11-13 | End: 2019-11-13 | Stop reason: HOSPADM

## 2019-11-13 RX ORDER — SODIUM CHLORIDE 9 MG/ML
250 INJECTION, SOLUTION INTRAVENOUS CONTINUOUS
Status: ACTIVE | OUTPATIENT
Start: 2019-11-13 | End: 2019-11-13

## 2019-11-13 RX ADMIN — ASPIRIN 325 MG: 325 TABLET, COATED ORAL at 13:31

## 2019-11-13 NOTE — H&P
Pre-Cardiac Catheterization Report  Cardiovascular Laboratory  [facility]      Patient:  Cholo Lion  :  1950  PCP:  Mayra Brewer APRN  PHONE:  [phone]    DATE: 2019    BRIEF HPI:  Cholo Lion is a 69 y.o. male with hypertension, hypercholesterolemia, systolic CHF-post previous biventricular ICD, and known coronary artery disease.  He is post previous CABG and stents to the left main coronary artery and circumflex coronary artery.  He is recently been complaining of some shortness of breath and dyspnea on exertion as well as fatigue.  He denies any chest pain.  He recently underwent an abnormal stress test and now presents for left heart catheterization with possible intervention.    Cardiac Risk Factors:  advanced age (older than 55 for men, 65 for women), dyslipidemia, family history of premature cardiovascular disease, hypertension, male gender, smoking/ tobacco exposure    Anginal class in last 2 weeks:  No anginal symptoms    CHF Class in last 2 weeks:  NYHA Class II    Cardiogenic shock:  no    Cardiac arrest <24 hours:  no    Stress test within last 6 months:   yes   Details:    Previous cardiac catheterization:  yes  Details:     Previous CABG:  yes  Details:      Allergies:     IV contrast allergy:  no  Allergies   Allergen Reactions   • Bactrim [Sulfamethoxazole-Trimethoprim]      UNKNOWN   • Lopressor [Metoprolol Tartrate]      hypotension   • Neosporin [Neomycin-Bacitracin Zn-Polymyx] Rash       MEDICATIONS:  Prior to Admission medications    Medication Sig Start Date End Date Taking? Authorizing Provider   aspirin 325 MG tablet Take 325 mg by mouth Every Morning.    Provider, MD Genet   atorvastatin (LIPITOR) 20 MG tablet Take 1 tablet by mouth daily.  Patient taking differently: Take 20 mg by mouth Every Morning. 16   Nigel Diaz PA   carvedilol (COREG) 3.125 MG tablet Take 1 tablet by mouth every 12 (twelve) hours.  8/11/16   Nigel Diaz PA   clopidogrel (PLAVIX) 75 MG tablet Take 1 tablet by mouth daily.  Patient taking differently: Take 75 mg by mouth Every Morning. 8/11/16   Nigel Diaz PA   nitroglycerin (NITROSTAT) 0.4 MG SL tablet Place 1 tablet under the tongue every 5 (five) minutes as needed for chest pain for up to 3 doses. Take no more than 3 doses in 15 minutes. 8/11/16   Nigel Diaz PA   spironolactone (ALDACTONE) 25 MG tablet Take 1 tablet by mouth daily.  Patient taking differently: Take 25 mg by mouth Every Morning. 8/11/16   Nigel Diaz PA       Past medical & surgical history, social and family history reviewed in the electronic medical record.    ROS:  Cardiovascular ROS: positive for - dyspnea on exertion and shortness of breath    Physical Exam:    Vitals: There were no vitals filed for this visit. There were no vitals filed for this visit.    General Appearance:    Alert, cooperative, in no acute distress   Head:    Normocephalic, without obvious abnormality, atraumatic   Eyes:            Lids and lashes normal, conjunctivae and sclerae normal, no   icterus, no pallor, corneas clear, PERRLA   Ears:    Ears appear intact with no abnormalities noted   Neck:   No adenopathy, supple, trachea midline, no thyromegaly, no   carotid bruit, no JVD   Back:     No kyphosis present, no scoliosis present, range of motion normal   Lungs:     Clear to auscultation,respirations regular, even and                  unlabored    Heart:    Regular rhythm and normal rate, normal S1 and S2, no            murmur, no gallop, no rub, no click   Chest Wall:    No abnormalities observed   Abdomen:     Normal bowel sounds, no masses, no organomegaly, soft        non-tender, non-distended, no guarding, no rebound                tenderness   Rectal:     Deferred   Extremities:   Moves all extremities well, no edema, no cyanosis, no             redness   Pulses:   Pulses palpable and equal bilaterally    Skin:   No bleeding, bruising or rash   Neurologic:   Cranial nerves 2 - 12 grossly intact, sensation intact     Barbaeu Test:  Left: Normal  (oxymetric Allens) Right: Not Assessed             Lab Results   Component Value Date    CHLPL 135 04/27/2015    TRIG 87 04/27/2015    HDL 37 (L) 04/27/2015    AST 20 08/09/2016    ALT 17 08/09/2016           Impression      · Abnormal stress test    Plan     · Procedure to perform: [facility]  · Planned access: Left radial artery              LUCIANA Doran  11/13/19  1:02 PM

## 2019-12-27 ENCOUNTER — DOCUMENTATION (OUTPATIENT)
Dept: NEUROSURGERY | Facility: CLINIC | Age: 69
End: 2019-12-27

## 2022-08-01 ENCOUNTER — APPOINTMENT (OUTPATIENT)
Dept: CT IMAGING | Facility: HOSPITAL | Age: 72
End: 2022-08-01

## 2022-08-01 ENCOUNTER — HOSPITAL ENCOUNTER (INPATIENT)
Facility: HOSPITAL | Age: 72
LOS: 2 days | Discharge: HOME | End: 2022-08-03
Attending: EMERGENCY MEDICINE | Admitting: INTERNAL MEDICINE

## 2022-08-01 ENCOUNTER — APPOINTMENT (OUTPATIENT)
Dept: GENERAL RADIOLOGY | Facility: HOSPITAL | Age: 72
End: 2022-08-01

## 2022-08-01 DIAGNOSIS — I63.9 ACUTE CVA (CEREBROVASCULAR ACCIDENT): Primary | ICD-10-CM

## 2022-08-01 DIAGNOSIS — R53.1 ACUTE RIGHT-SIDED WEAKNESS: ICD-10-CM

## 2022-08-01 LAB
ALT SERPL W P-5'-P-CCNC: 8 U/L (ref 1–41)
APTT PPP: 29.6 SECONDS (ref 22–39)
AST SERPL-CCNC: 11 U/L (ref 1–40)
BASOPHILS # BLD AUTO: 0.06 10*3/MM3 (ref 0–0.2)
BASOPHILS NFR BLD AUTO: 0.6 % (ref 0–1.5)
BUN BLDA-MCNC: 16 MG/DL (ref 8–26)
CA-I BLDA-SCNC: 1.2 MMOL/L (ref 1.2–1.32)
CHLORIDE BLDA-SCNC: 102 MMOL/L (ref 98–109)
CO2 BLDA-SCNC: 29 MMOL/L (ref 24–29)
CREAT BLDA-MCNC: 1 MG/DL (ref 0.6–1.3)
DEPRECATED RDW RBC AUTO: 44.8 FL (ref 37–54)
EGFRCR SERPLBLD CKD-EPI 2021: 80 ML/MIN/1.73
EOSINOPHIL # BLD AUTO: 0.31 10*3/MM3 (ref 0–0.4)
EOSINOPHIL NFR BLD AUTO: 3 % (ref 0.3–6.2)
ERYTHROCYTE [DISTWIDTH] IN BLOOD BY AUTOMATED COUNT: 13.1 % (ref 12.3–15.4)
FLUAV RNA RESP QL NAA+PROBE: NOT DETECTED
FLUBV RNA RESP QL NAA+PROBE: NOT DETECTED
GLUCOSE BLDC GLUCOMTR-MCNC: 86 MG/DL (ref 70–130)
GLUCOSE BLDC GLUCOMTR-MCNC: 92 MG/DL (ref 70–130)
HCT VFR BLD AUTO: 43.8 % (ref 37.5–51)
HCT VFR BLDA CALC: 43 % (ref 38–51)
HGB BLD-MCNC: 14.2 G/DL (ref 13–17.7)
HGB BLDA-MCNC: 14.6 G/DL (ref 12–17)
HOLD SPECIMEN: NORMAL
IMM GRANULOCYTES # BLD AUTO: 0.05 10*3/MM3 (ref 0–0.05)
IMM GRANULOCYTES NFR BLD AUTO: 0.5 % (ref 0–0.5)
INR PPP: 0.9 (ref 0.8–1.2)
LYMPHOCYTES # BLD AUTO: 2.52 10*3/MM3 (ref 0.7–3.1)
LYMPHOCYTES NFR BLD AUTO: 24.7 % (ref 19.6–45.3)
MCH RBC QN AUTO: 30 PG (ref 26.6–33)
MCHC RBC AUTO-ENTMCNC: 32.4 G/DL (ref 31.5–35.7)
MCV RBC AUTO: 92.4 FL (ref 79–97)
MONOCYTES # BLD AUTO: 0.82 10*3/MM3 (ref 0.1–0.9)
MONOCYTES NFR BLD AUTO: 8 % (ref 5–12)
NEUTROPHILS NFR BLD AUTO: 6.46 10*3/MM3 (ref 1.7–7)
NEUTROPHILS NFR BLD AUTO: 63.2 % (ref 42.7–76)
NRBC BLD AUTO-RTO: 0 /100 WBC (ref 0–0.2)
PLAT MORPH BLD: NORMAL
PLATELET # BLD AUTO: 232 10*3/MM3 (ref 140–450)
PMV BLD AUTO: 10.8 FL (ref 6–12)
POTASSIUM BLDA-SCNC: 5.2 MMOL/L (ref 3.5–4.9)
PROTHROMBIN TIME: 11.1 SECONDS (ref 12.8–15.2)
QT INTERVAL: 454 MS
QTC INTERVAL: 510 MS
RBC # BLD AUTO: 4.74 10*6/MM3 (ref 4.14–5.8)
RBC MORPH BLD: NORMAL
SARS-COV-2 RNA RESP QL NAA+PROBE: NOT DETECTED
SODIUM BLD-SCNC: 140 MMOL/L (ref 138–146)
TROPONIN T SERPL-MCNC: <0.01 NG/ML (ref 0–0.03)
WBC MORPH BLD: NORMAL
WBC NRBC COR # BLD: 10.22 10*3/MM3 (ref 3.4–10.8)
WHOLE BLOOD HOLD COAG: NORMAL
WHOLE BLOOD HOLD SPECIMEN: NORMAL

## 2022-08-01 PROCEDURE — 84450 TRANSFERASE (AST) (SGOT): CPT | Performed by: EMERGENCY MEDICINE

## 2022-08-01 PROCEDURE — 70496 CT ANGIOGRAPHY HEAD: CPT

## 2022-08-01 PROCEDURE — 0042T HC CT CEREBRAL PERFUSION W/WO CONTRAST: CPT

## 2022-08-01 PROCEDURE — 87636 SARSCOV2 & INF A&B AMP PRB: CPT | Performed by: INTERNAL MEDICINE

## 2022-08-01 PROCEDURE — 4A03X5D MEASUREMENT OF ARTERIAL FLOW, INTRACRANIAL, EXTERNAL APPROACH: ICD-10-PCS | Performed by: STUDENT IN AN ORGANIZED HEALTH CARE EDUCATION/TRAINING PROGRAM

## 2022-08-01 PROCEDURE — 82962 GLUCOSE BLOOD TEST: CPT

## 2022-08-01 PROCEDURE — 25010000002 TENECTEPLASE PER 50 MG: Performed by: EMERGENCY MEDICINE

## 2022-08-01 PROCEDURE — 71045 X-RAY EXAM CHEST 1 VIEW: CPT

## 2022-08-01 PROCEDURE — 84484 ASSAY OF TROPONIN QUANT: CPT | Performed by: EMERGENCY MEDICINE

## 2022-08-01 PROCEDURE — 85610 PROTHROMBIN TIME: CPT

## 2022-08-01 PROCEDURE — 99223 1ST HOSP IP/OBS HIGH 75: CPT | Performed by: INTERNAL MEDICINE

## 2022-08-01 PROCEDURE — 3E03317 INTRODUCTION OF OTHER THROMBOLYTIC INTO PERIPHERAL VEIN, PERCUTANEOUS APPROACH: ICD-10-PCS | Performed by: EMERGENCY MEDICINE

## 2022-08-01 PROCEDURE — 85007 BL SMEAR W/DIFF WBC COUNT: CPT | Performed by: EMERGENCY MEDICINE

## 2022-08-01 PROCEDURE — 85025 COMPLETE CBC W/AUTO DIFF WBC: CPT | Performed by: EMERGENCY MEDICINE

## 2022-08-01 PROCEDURE — 70498 CT ANGIOGRAPHY NECK: CPT

## 2022-08-01 PROCEDURE — 85014 HEMATOCRIT: CPT

## 2022-08-01 PROCEDURE — 92523 SPEECH SOUND LANG COMPREHEN: CPT

## 2022-08-01 PROCEDURE — 93005 ELECTROCARDIOGRAM TRACING: CPT | Performed by: EMERGENCY MEDICINE

## 2022-08-01 PROCEDURE — 99223 1ST HOSP IP/OBS HIGH 75: CPT | Performed by: STUDENT IN AN ORGANIZED HEALTH CARE EDUCATION/TRAINING PROGRAM

## 2022-08-01 PROCEDURE — 85730 THROMBOPLASTIN TIME PARTIAL: CPT | Performed by: EMERGENCY MEDICINE

## 2022-08-01 PROCEDURE — 99285 EMERGENCY DEPT VISIT HI MDM: CPT

## 2022-08-01 PROCEDURE — 70450 CT HEAD/BRAIN W/O DYE: CPT

## 2022-08-01 PROCEDURE — 84460 ALANINE AMINO (ALT) (SGPT): CPT | Performed by: EMERGENCY MEDICINE

## 2022-08-01 PROCEDURE — 0 IOPAMIDOL PER 1 ML: Performed by: EMERGENCY MEDICINE

## 2022-08-01 PROCEDURE — 80047 BASIC METABLC PNL IONIZED CA: CPT

## 2022-08-01 RX ORDER — SODIUM CHLORIDE 0.9 % (FLUSH) 0.9 %
10 SYRINGE (ML) INJECTION AS NEEDED
Status: DISCONTINUED | OUTPATIENT
Start: 2022-08-01 | End: 2022-08-03 | Stop reason: HOSPADM

## 2022-08-01 RX ORDER — SODIUM CHLORIDE 0.9 % (FLUSH) 0.9 %
10 SYRINGE (ML) INJECTION AS NEEDED
Status: DISCONTINUED | OUTPATIENT
Start: 2022-08-01 | End: 2022-08-01

## 2022-08-01 RX ORDER — EZETIMIBE 10 MG/1
10 TABLET ORAL DAILY
COMMUNITY

## 2022-08-01 RX ORDER — SODIUM CHLORIDE 0.9 % (FLUSH) 0.9 %
10 SYRINGE (ML) INJECTION ONCE
Status: COMPLETED | OUTPATIENT
Start: 2022-08-01 | End: 2022-08-01

## 2022-08-01 RX ORDER — SODIUM CHLORIDE 9 MG/ML
75 INJECTION, SOLUTION INTRAVENOUS CONTINUOUS
Status: DISCONTINUED | OUTPATIENT
Start: 2022-08-01 | End: 2022-08-02

## 2022-08-01 RX ORDER — SODIUM CHLORIDE 0.9 % (FLUSH) 0.9 %
10 SYRINGE (ML) INJECTION EVERY 12 HOURS SCHEDULED
Status: DISCONTINUED | OUTPATIENT
Start: 2022-08-01 | End: 2022-08-03 | Stop reason: HOSPADM

## 2022-08-01 RX ORDER — ASPIRIN 325 MG
325 TABLET ORAL DAILY
Status: DISCONTINUED | OUTPATIENT
Start: 2022-08-02 | End: 2022-08-02

## 2022-08-01 RX ORDER — ATORVASTATIN CALCIUM 40 MG/1
80 TABLET, FILM COATED ORAL NIGHTLY
Status: DISCONTINUED | OUTPATIENT
Start: 2022-08-01 | End: 2022-08-03 | Stop reason: HOSPADM

## 2022-08-01 RX ORDER — ASPIRIN 300 MG/1
300 SUPPOSITORY RECTAL DAILY
Status: DISCONTINUED | OUTPATIENT
Start: 2022-08-02 | End: 2022-08-02

## 2022-08-01 RX ADMIN — Medication 10 ML: at 20:28

## 2022-08-01 RX ADMIN — ATORVASTATIN CALCIUM 80 MG: 40 TABLET, FILM COATED ORAL at 20:28

## 2022-08-01 RX ADMIN — SODIUM CHLORIDE 75 ML/HR: 9 INJECTION, SOLUTION INTRAVENOUS at 12:51

## 2022-08-01 RX ADMIN — IOPAMIDOL 115 ML: 755 INJECTION, SOLUTION INTRAVENOUS at 10:56

## 2022-08-01 RX ADMIN — Medication 10 ML: at 10:51

## 2022-08-01 RX ADMIN — Medication 25 MG: at 10:51

## 2022-08-01 RX ADMIN — Medication 10 ML: at 10:52

## 2022-08-01 NOTE — H&P
INTENSIVIST   INITIAL HOSPITAL VISIT NOTE     Hospital:  LOS: 0 days     Mr. Cholo Lion, 72 y.o. male is seen for a Chief Complaint of:  Chief Complaint   Patient presents with   • Stroke       Acute CVA (cerebrovascular accident) (HCC)    Coronary artery disease involving native coronary artery of native heart without angina pectoris    Essential hypertension    Tobacco abuse    COPD (chronic obstructive pulmonary disease) (HCC)    Ischemic cardiomyopathy      Subjective   S     Mr. Lion is a 73yo M with a history of CAD s/p CABG, HTN, HLD, COPD, Tobacco use, Afib (no anticoagulation), Ischemic cardiomyopathy s/p ICD placement and chronic systolic and diastolic heart failure (EF 20% and Grade III diastolic dysfunction on echo from 2016) who presented to the [facility] ED with acute onset of right upper extremity weakness and inability to use his right leg. CT head was performed and showed no acute intracranial abnormality. He was given TNK. CT perfusion and CTA of the head and neck were unremarkable. He will be admitted to the ICU for ongoing care.        PMH: He  has a past medical history of Bundle branch block, right, Cancer ([facility]), Cardiomyopathy ([facility]), Chest pain, CHF (congestive heart failure) (HCC), COPD (chronic obstructive pulmonary disease) ([facility]) (12/19/2016), Coronary artery disease, Heart attack ([facility]) (1993), History of shingles, Hyperlipidemia, Hypertension, Lung nodules (12/19/2016), and Tremors of nervous system.   PSxH: He  has a past surgical history that includes Cardiac catheterization (N/A, 8/10/2016); Coronary artery bypass graft; Cardiac electrophysiology procedure (N/A, 12/30/2016); Colon surgery (06/26/2016); biventricullar implantable cardioverter defibrillator placement; Other surgical history; Lipoma Excision; and Cardiac catheterization (N/A, 11/13/2019).      Medications:  No current facility-administered medications on file prior to encounter.     Current Outpatient Medications  on File Prior to Encounter   Medication Sig   • aspirin 325 MG tablet Take 325 mg by mouth Every Morning.   • atorvastatin (LIPITOR) 20 MG tablet Take 1 tablet by mouth daily. (Patient taking differently: Take 20 mg by mouth Every Night.)   • carvedilol (COREG) 3.125 MG tablet Take 1 tablet by mouth every 12 (twelve) hours.   • clopidogrel (PLAVIX) 75 MG tablet Take 1 tablet by mouth daily. (Patient taking differently: Take 75 mg by mouth Every Morning.)   • ezetimibe (ZETIA) 10 MG tablet Take 10 mg by mouth Daily.   • sacubitril-valsartan (ENTRESTO) 24-26 MG tablet Take 1 tablet by mouth 2 (Two) Times a Day.   • spironolactone (ALDACTONE) 25 MG tablet Take 1 tablet by mouth daily. (Patient taking differently: Take 25 mg by mouth Every Morning.)   • nitroglycerin (NITROSTAT) 0.4 MG SL tablet Place 1 tablet under the tongue every 5 (five) minutes as needed for chest pain for up to 3 doses. Take no more than 3 doses in 15 minutes.       Allergies: He is allergic to bactrim [sulfamethoxazole-trimethoprim], lopressor [metoprolol tartrate], and neosporin [neomycin-bacitracin zn-polymyx].   FH: His family history includes Clotting disorder in his sister; Heart attack in his brother; No Known Problems in his father and mother.   SH: He  reports that he has been smoking cigarettes. He has been smoking about 0.50 packs per day. He has never used smokeless tobacco. He reports current alcohol use. He reports that he does not use drugs.     The patient's relevant past medical, surgical and social history were reviewed and updated in Epic as appropriate.     ROS (14):   Review of Systems   HENT: Negative.    Eyes: Negative.    Respiratory: Negative.    Cardiovascular: Negative.    Gastrointestinal: Negative.    Endocrine: Negative.    Genitourinary: Negative.    Musculoskeletal: Positive for gait problem.   Skin: Negative.    Allergic/Immunologic: Negative.    Neurological: Positive for weakness.   Hematological: Negative.     Psychiatric/Behavioral: Negative.        Objective   O     Vitals    Temp  Min: 97.5 °F (36.4 °C)  Max: 97.8 °F (36.6 °C)  BP  Min: 105/71  Max: 127/68  Pulse  Min: 68  Max: 79  Resp  Min: 14  Max: 22  SpO2  Min: 96 %  Max: 99 % No data recorded     I/O 24 hrs (7:00AM - 6:59 AM)  Intake/Output     None          Medications (drips):  niCARdipine  sodium chloride, Last Rate: 75 mL/hr (08/01/22 1251)        Physical Examination    Telemetry: Normal sinus rhythm.    Constitutional:  No acute distress.  Conversant. Sitting up in bed.    HEENT: Normocephalic and atraumatic.   Neck:  Normal range of motion. Neck supple.   No JVD present.   No thyromegaly.    Cardiovascular: Regular rate and rhythm.  No murmurs, rub or gallop.  No peripheral edema.   Respiratory: Normal respiratory effort.  Diminished bilaterally.    Abdominal:  Soft. No masses.   Non-tender. No distension.   No hepatosplenomegaly.   MSK: Normal muscle strength and tone.   Extremities: No digital cyanosis or clubbing.   Skin: Skin is warm and dry.  No rashes, lesions or ulcers noted.    Neurological:   Alert and Oriented to person, place, and time.   Right sided weakness.    Psychiatric:  Normal affect.   Normal judgment.     Imaging Results (Last 24 Hours)     Procedure Component Value Units Date/Time    CT CEREBRAL PERFUSION WITH & WITHOUT CONTRAST [[id number]] Collected: 08/01/22 1129     Updated: 08/01/22 1202    Narrative:      DATE OF EXAM: 8/1/2022 10:50 AM     PROCEDURE: CT CEREBRAL PERFUSION W WO CONTRAST-, CT ANGIOGRAM NECK-, CT  ANGIOGRAM HEAD W AI ANALYSIS OF LVO-     INDICATIONS: Neuro deficit, acute, stroke suspected     COMPARISON: Concurrent noncontrast CT of the head     TECHNIQUE: Routine transaxial cuts were obtained through the head  without administration of contrast. Routine transaxial cuts were then  obtained through the head following the intravenous administration of  115 mL of Isovue 370. Core blood volume, core blood flow, mean  transit  time, and Tmax images were obtained utilizing the Rapid software  protocol. A limited CT angiogram of the head was also performed to  measure the blood vessel density.      CTA of the head and CTA of the neck was performed after the intravenous  administration of above contrast. Reconstructed coronal and sagittal  images were also obtained. In addition, a 3-D volume rendered image was  obtained after post processing. Automated exposure control and iterative  reconstruction methods were used. AI analysis of LVO was utilized for  the CTA Head imaging portion of the study.      The radiation dose reduction device was turned on for each scan per the  ALARA (As Low as Reasonably Achievable) protocol.     Stenosis measurement was performed by the NASCET or similar method.     FINDINGS:   CT cerebral perfusion:  There is grossly symmetric cerebral perfusion with mild delayed  perfusion in the bilateral watershed regions, including a small region  of Tmax >6 seconds (7 mL) in the left corona radiata. No evidence of  core infarct.     CTA HEAD:  No abrupt cut off/large vessel occlusion, flow-limiting stenosis,  dissection, or aneurysm. Mild atherosclerosis in the left greater than  right carotid siphons without flow-limiting stenosis or significant  luminal irregularity. Diminutive right P1 segment with mostly fetal  origin of the right PCA. Diminutive appearance of the right V4 segment  beyond the PICA branch. The cerebral veins and dural venous sinuses are  grossly patent.     CTA NECK:  Normal appearance of the bilateral common carotid arteries.      There is soft and calcified atherosclerotic plaque at the right carotid  bifurcation origin of the right ICA which results focal narrowing of the  ICA origin measuring approximately 75% per NASCET criteria (series 10  image 169, series 909 image 43, series 907 image 10). There is torturous  course of the mid right ICA with otherwise unremarkable appearance of  the  remaining right ICA. There is severe/high-grade narrowing of the  origin of the right external carotid artery with thread-like origin and  multiple small distal patent arterial collaterals (series 10 image 168).     There is soft and calcified atherosclerotic plaque at the left carotid  bifurcation and proximal left ICA with mild luminal irregularity,  without significant stenosis per NASCET criteria (<50% narrowing)  (series 10 image 167, series 906 image 8). There is torturous course of  the mid and distal left ICA with otherwise unremarkable appearance.     Dominant left vertebral artery system, without evidence of abrupt cut  off/large vessel occlusion, flow-limiting stenosis, dissection, or  aneurysm of the vertebral arteries.     Mild atherosclerosis of the thoracic aorta and aortic arch branch  vessels without significant luminal narrowing.     Extravascular findings:  The upper lungs appear grossly clear. No evidence of pharyngeal or  laryngeal mass lesion. Unremarkable appearance of the thyroid gland. No  cervical adenopathy. Multilevel degenerative disc disease in the mid and  lower cervical spine without acute osseous findings.             Impression:      Grossly symmetric cerebral perfusion with mild delayed perfusion in the  bilateral watershed regions including small region of Tmax >6 seconds (7  mm) in the left corona radiata, without evidence of core infarct.     Essentially normal CTA of the head.     Moderate atherosclerotic narrowing of the origin of the right ICA by  approximately 75% per NASCET criteria.     Severe narrowing of the origin of the right external carotid artery.     This report was finalized on 8/1/2022 11:58 AM by Kev Wright MD.       CT Angiogram Head w AI Analysis of LVO [[id number]] Collected: 08/01/22 1129     Updated: 08/01/22 1202    Narrative:      DATE OF EXAM: 8/1/2022 10:50 AM     PROCEDURE: CT CEREBRAL PERFUSION W WO CONTRAST-, CT ANGIOGRAM NECK-, CT  ANGIOGRAM  HEAD W AI ANALYSIS OF LVO-     INDICATIONS: Neuro deficit, acute, stroke suspected     COMPARISON: Concurrent noncontrast CT of the head     TECHNIQUE: Routine transaxial cuts were obtained through the head  without administration of contrast. Routine transaxial cuts were then  obtained through the head following the intravenous administration of  115 mL of Isovue 370. Core blood volume, core blood flow, mean transit  time, and Tmax images were obtained utilizing the Rapid software  protocol. A limited CT angiogram of the head was also performed to  measure the blood vessel density.      CTA of the head and CTA of the neck was performed after the intravenous  administration of above contrast. Reconstructed coronal and sagittal  images were also obtained. In addition, a 3-D volume rendered image was  obtained after post processing. Automated exposure control and iterative  reconstruction methods were used. AI analysis of LVO was utilized for  the CTA Head imaging portion of the study.      The radiation dose reduction device was turned on for each scan per the  ALARA (As Low as Reasonably Achievable) protocol.     Stenosis measurement was performed by the NASCET or similar method.     FINDINGS:   CT cerebral perfusion:  There is grossly symmetric cerebral perfusion with mild delayed  perfusion in the bilateral watershed regions, including a small region  of Tmax >6 seconds (7 mL) in the left corona radiata. No evidence of  core infarct.     CTA HEAD:  No abrupt cut off/large vessel occlusion, flow-limiting stenosis,  dissection, or aneurysm. Mild atherosclerosis in the left greater than  right carotid siphons without flow-limiting stenosis or significant  luminal irregularity. Diminutive right P1 segment with mostly fetal  origin of the right PCA. Diminutive appearance of the right V4 segment  beyond the PICA branch. The cerebral veins and dural venous sinuses are  grossly patent.     CTA NECK:  Normal appearance of  the bilateral common carotid arteries.      There is soft and calcified atherosclerotic plaque at the right carotid  bifurcation origin of the right ICA which results focal narrowing of the  ICA origin measuring approximately 75% per NASCET criteria (series 10  image 169, series 909 image 43, series 907 image 10). There is torturous  course of the mid right ICA with otherwise unremarkable appearance of  the remaining right ICA. There is severe/high-grade narrowing of the  origin of the right external carotid artery with thread-like origin and  multiple small distal patent arterial collaterals (series 10 image 168).     There is soft and calcified atherosclerotic plaque at the left carotid  bifurcation and proximal left ICA with mild luminal irregularity,  without significant stenosis per NASCET criteria (<50% narrowing)  (series 10 image 167, series 906 image 8). There is torturous course of  the mid and distal left ICA with otherwise unremarkable appearance.     Dominant left vertebral artery system, without evidence of abrupt cut  off/large vessel occlusion, flow-limiting stenosis, dissection, or  aneurysm of the vertebral arteries.     Mild atherosclerosis of the thoracic aorta and aortic arch branch  vessels without significant luminal narrowing.     Extravascular findings:  The upper lungs appear grossly clear. No evidence of pharyngeal or  laryngeal mass lesion. Unremarkable appearance of the thyroid gland. No  cervical adenopathy. Multilevel degenerative disc disease in the mid and  lower cervical spine without acute osseous findings.             Impression:      Grossly symmetric cerebral perfusion with mild delayed perfusion in the  bilateral watershed regions including small region of Tmax >6 seconds (7  mm) in the left corona radiata, without evidence of core infarct.     Essentially normal CTA of the head.     Moderate atherosclerotic narrowing of the origin of the right ICA by  approximately 75% per  NASCET criteria.     Severe narrowing of the origin of the right external carotid artery.     This report was finalized on 8/1/2022 11:58 AM by Kev Wright MD.       CT Angiogram Neck [[id number]] Collected: 08/01/22 1129     Updated: 08/01/22 1202    Narrative:      DATE OF EXAM: 8/1/2022 10:50 AM     PROCEDURE: CT CEREBRAL PERFUSION W WO CONTRAST-, CT ANGIOGRAM NECK-, CT  ANGIOGRAM HEAD W AI ANALYSIS OF LVO-     INDICATIONS: Neuro deficit, acute, stroke suspected     COMPARISON: Concurrent noncontrast CT of the head     TECHNIQUE: Routine transaxial cuts were obtained through the head  without administration of contrast. Routine transaxial cuts were then  obtained through the head following the intravenous administration of  115 mL of Isovue 370. Core blood volume, core blood flow, mean transit  time, and Tmax images were obtained utilizing the Rapid software  protocol. A limited CT angiogram of the head was also performed to  measure the blood vessel density.      CTA of the head and CTA of the neck was performed after the intravenous  administration of above contrast. Reconstructed coronal and sagittal  images were also obtained. In addition, a 3-D volume rendered image was  obtained after post processing. Automated exposure control and iterative  reconstruction methods were used. AI analysis of LVO was utilized for  the CTA Head imaging portion of the study.      The radiation dose reduction device was turned on for each scan per the  ALARA (As Low as Reasonably Achievable) protocol.     Stenosis measurement was performed by the NASCET or similar method.     FINDINGS:   CT cerebral perfusion:  There is grossly symmetric cerebral perfusion with mild delayed  perfusion in the bilateral watershed regions, including a small region  of Tmax >6 seconds (7 mL) in the left corona radiata. No evidence of  core infarct.     CTA HEAD:  No abrupt cut off/large vessel occlusion, flow-limiting stenosis,  dissection, or  aneurysm. Mild atherosclerosis in the left greater than  right carotid siphons without flow-limiting stenosis or significant  luminal irregularity. Diminutive right P1 segment with mostly fetal  origin of the right PCA. Diminutive appearance of the right V4 segment  beyond the PICA branch. The cerebral veins and dural venous sinuses are  grossly patent.     CTA NECK:  Normal appearance of the bilateral common carotid arteries.      There is soft and calcified atherosclerotic plaque at the right carotid  bifurcation origin of the right ICA which results focal narrowing of the  ICA origin measuring approximately 75% per NASCET criteria (series 10  image 169, series 909 image 43, series 907 image 10). There is torturous  course of the mid right ICA with otherwise unremarkable appearance of  the remaining right ICA. There is severe/high-grade narrowing of the  origin of the right external carotid artery with thread-like origin and  multiple small distal patent arterial collaterals (series 10 image 168).     There is soft and calcified atherosclerotic plaque at the left carotid  bifurcation and proximal left ICA with mild luminal irregularity,  without significant stenosis per NASCET criteria (<50% narrowing)  (series 10 image 167, series 906 image 8). There is torturous course of  the mid and distal left ICA with otherwise unremarkable appearance.     Dominant left vertebral artery system, without evidence of abrupt cut  off/large vessel occlusion, flow-limiting stenosis, dissection, or  aneurysm of the vertebral arteries.     Mild atherosclerosis of the thoracic aorta and aortic arch branch  vessels without significant luminal narrowing.     Extravascular findings:  The upper lungs appear grossly clear. No evidence of pharyngeal or  laryngeal mass lesion. Unremarkable appearance of the thyroid gland. No  cervical adenopathy. Multilevel degenerative disc disease in the mid and  lower cervical spine without acute osseous  findings.             Impression:      Grossly symmetric cerebral perfusion with mild delayed perfusion in the  bilateral watershed regions including small region of Tmax >6 seconds (7  mm) in the left corona radiata, without evidence of core infarct.     Essentially normal CTA of the head.     Moderate atherosclerotic narrowing of the origin of the right ICA by  approximately 75% per NASCET criteria.     Severe narrowing of the origin of the right external carotid artery.     This report was finalized on 8/1/2022 11:58 AM by Kev Wright MD.       XR Chest 1 View [[id number]] Collected: 08/01/22 1146     Updated: 08/01/22 1150    Narrative:      DATE OF EXAM: 8/1/2022 11:02 AM     PROCEDURE: XR CHEST 1 VW-     INDICATIONS: Acute Stroke Protocol (onset < 12 hrs); I63.9-Cerebral  infarction, unspecified; R53.1-Weakness     COMPARISON: 12/31/2016     TECHNIQUE: Single radiographic AP view of the chest was obtained.     FINDINGS:  Sternotomy wires are noted. A cardiac pacemaker device is present. There  is no sarita pulmonary consolidation. There probably are some chronic  changes at the lung bases.        Impression:      1.  Some suggested chronic changes at the lung bases.     This report was finalized on 8/1/2022 11:47 AM by Rolando Solorzano MD.       CT Head Without Contrast Stroke Protocol [[id number]] Collected: 08/01/22 1048     Updated: 08/01/22 1054    Narrative:      DATE OF EXAM: 8/1/2022 10:42 AM     PROCEDURE: CT HEAD WO CONTRAST STROKE PROTOCOL-     INDICATIONS: Neuro deficit, acute, stroke suspected     COMPARISON: 04/18/2009     TECHNIQUE: Routine transaxial and coronal reconstruction images were  obtained through the head without the administration of contrast.  Automated exposure control and iterative reconstruction methods were  used.        FINDINGS:  The ventricles are not dilated. There is no underlying hemorrhage. There  are no abnormal extra-axial fluid collections. There is no midline  shift. The  paranasal sinuses seem relatively clear. There is minimal  mucosal thickening in an anterior ethmoid air cell on the left. This is  unchanged. There is some cerebral atrophy not unexpected for the  patient's age.        Impression:      1.  An acute intracranial abnormality is not appreciated.     . Study was reviewed and discussed with team navigator at 10:42 AM.     This report was finalized on 8/1/2022 10:51 AM by Rolando Solorzano MD.                Results from last 7 days   Lab Units 08/01/22  1104 08/01/22  1058   WBC 10*3/mm3 10.22  --    HEMOGLOBIN g/dL 14.2  --    HEMOGLOBIN, POC g/dL  --  14.6   MCV fL 92.4  --    PLATELETS 10*3/mm3 232  --      Results from last 7 days   Lab Units 08/01/22  1058   CREATININE mg/dL 1.00     Estimated Creatinine Clearance: 76.3 mL/min (by C-G formula based on SCr of 1 mg/dL).  Results from last 7 days   Lab Units 08/01/22  1149   ALT (SGPT) U/L 8   AST (SGOT) U/L 11             Images:    Imaging Results (Last 24 Hours)     Procedure Component Value Units Date/Time    CT CEREBRAL PERFUSION WITH & WITHOUT CONTRAST [[id number]] Collected: 08/01/22 1129     Updated: 08/01/22 1202    Narrative:      DATE OF EXAM: 8/1/2022 10:50 AM     PROCEDURE: CT CEREBRAL PERFUSION W WO CONTRAST-, CT ANGIOGRAM NECK-, CT  ANGIOGRAM HEAD W AI ANALYSIS OF LVO-     INDICATIONS: Neuro deficit, acute, stroke suspected     COMPARISON: Concurrent noncontrast CT of the head     TECHNIQUE: Routine transaxial cuts were obtained through the head  without administration of contrast. Routine transaxial cuts were then  obtained through the head following the intravenous administration of  115 mL of Isovue 370. Core blood volume, core blood flow, mean transit  time, and Tmax images were obtained utilizing the Rapid software  protocol. A limited CT angiogram of the head was also performed to  measure the blood vessel density.      CTA of the head and CTA of the neck was performed after the  intravenous  administration of above contrast. Reconstructed coronal and sagittal  images were also obtained. In addition, a 3-D volume rendered image was  obtained after post processing. Automated exposure control and iterative  reconstruction methods were used. AI analysis of LVO was utilized for  the CTA Head imaging portion of the study.      The radiation dose reduction device was turned on for each scan per the  ALARA (As Low as Reasonably Achievable) protocol.     Stenosis measurement was performed by the NASCET or similar method.     FINDINGS:   CT cerebral perfusion:  There is grossly symmetric cerebral perfusion with mild delayed  perfusion in the bilateral watershed regions, including a small region  of Tmax >6 seconds (7 mL) in the left corona radiata. No evidence of  core infarct.     CTA HEAD:  No abrupt cut off/large vessel occlusion, flow-limiting stenosis,  dissection, or aneurysm. Mild atherosclerosis in the left greater than  right carotid siphons without flow-limiting stenosis or significant  luminal irregularity. Diminutive right P1 segment with mostly fetal  origin of the right PCA. Diminutive appearance of the right V4 segment  beyond the PICA branch. The cerebral veins and dural venous sinuses are  grossly patent.     CTA NECK:  Normal appearance of the bilateral common carotid arteries.      There is soft and calcified atherosclerotic plaque at the right carotid  bifurcation origin of the right ICA which results focal narrowing of the  ICA origin measuring approximately 75% per NASCET criteria (series 10  image 169, series 909 image 43, series 907 image 10). There is torturous  course of the mid right ICA with otherwise unremarkable appearance of  the remaining right ICA. There is severe/high-grade narrowing of the  origin of the right external carotid artery with thread-like origin and  multiple small distal patent arterial collaterals (series 10 image 168).     There is soft and calcified  atherosclerotic plaque at the left carotid  bifurcation and proximal left ICA with mild luminal irregularity,  without significant stenosis per NASCET criteria (<50% narrowing)  (series 10 image 167, series 906 image 8). There is torturous course of  the mid and distal left ICA with otherwise unremarkable appearance.     Dominant left vertebral artery system, without evidence of abrupt cut  off/large vessel occlusion, flow-limiting stenosis, dissection, or  aneurysm of the vertebral arteries.     Mild atherosclerosis of the thoracic aorta and aortic arch branch  vessels without significant luminal narrowing.     Extravascular findings:  The upper lungs appear grossly clear. No evidence of pharyngeal or  laryngeal mass lesion. Unremarkable appearance of the thyroid gland. No  cervical adenopathy. Multilevel degenerative disc disease in the mid and  lower cervical spine without acute osseous findings.             Impression:      Grossly symmetric cerebral perfusion with mild delayed perfusion in the  bilateral watershed regions including small region of Tmax >6 seconds (7  mm) in the left corona radiata, without evidence of core infarct.     Essentially normal CTA of the head.     Moderate atherosclerotic narrowing of the origin of the right ICA by  approximately 75% per NASCET criteria.     Severe narrowing of the origin of the right external carotid artery.     This report was finalized on 8/1/2022 11:58 AM by Kev Wright MD.       CT Angiogram Head w AI Analysis of LVO [[id number]] Collected: 08/01/22 1129     Updated: 08/01/22 1202    Narrative:      DATE OF EXAM: 8/1/2022 10:50 AM     PROCEDURE: CT CEREBRAL PERFUSION W WO CONTRAST-, CT ANGIOGRAM NECK-, CT  ANGIOGRAM HEAD W AI ANALYSIS OF LVO-     INDICATIONS: Neuro deficit, acute, stroke suspected     COMPARISON: Concurrent noncontrast CT of the head     TECHNIQUE: Routine transaxial cuts were obtained through the head  without administration of contrast.  Routine transaxial cuts were then  obtained through the head following the intravenous administration of  115 mL of Isovue 370. Core blood volume, core blood flow, mean transit  time, and Tmax images were obtained utilizing the Rapid software  protocol. A limited CT angiogram of the head was also performed to  measure the blood vessel density.      CTA of the head and CTA of the neck was performed after the intravenous  administration of above contrast. Reconstructed coronal and sagittal  images were also obtained. In addition, a 3-D volume rendered image was  obtained after post processing. Automated exposure control and iterative  reconstruction methods were used. AI analysis of LVO was utilized for  the CTA Head imaging portion of the study.      The radiation dose reduction device was turned on for each scan per the  ALARA (As Low as Reasonably Achievable) protocol.     Stenosis measurement was performed by the NASCET or similar method.     FINDINGS:   CT cerebral perfusion:  There is grossly symmetric cerebral perfusion with mild delayed  perfusion in the bilateral watershed regions, including a small region  of Tmax >6 seconds (7 mL) in the left corona radiata. No evidence of  core infarct.     CTA HEAD:  No abrupt cut off/large vessel occlusion, flow-limiting stenosis,  dissection, or aneurysm. Mild atherosclerosis in the left greater than  right carotid siphons without flow-limiting stenosis or significant  luminal irregularity. Diminutive right P1 segment with mostly fetal  origin of the right PCA. Diminutive appearance of the right V4 segment  beyond the PICA branch. The cerebral veins and dural venous sinuses are  grossly patent.     CTA NECK:  Normal appearance of the bilateral common carotid arteries.      There is soft and calcified atherosclerotic plaque at the right carotid  bifurcation origin of the right ICA which results focal narrowing of the  ICA origin measuring approximately 75% per NASCET  criteria (series 10  image 169, series 909 image 43, series 907 image 10). There is torturous  course of the mid right ICA with otherwise unremarkable appearance of  the remaining right ICA. There is severe/high-grade narrowing of the  origin of the right external carotid artery with thread-like origin and  multiple small distal patent arterial collaterals (series 10 image 168).     There is soft and calcified atherosclerotic plaque at the left carotid  bifurcation and proximal left ICA with mild luminal irregularity,  without significant stenosis per NASCET criteria (<50% narrowing)  (series 10 image 167, series 906 image 8). There is torturous course of  the mid and distal left ICA with otherwise unremarkable appearance.     Dominant left vertebral artery system, without evidence of abrupt cut  off/large vessel occlusion, flow-limiting stenosis, dissection, or  aneurysm of the vertebral arteries.     Mild atherosclerosis of the thoracic aorta and aortic arch branch  vessels without significant luminal narrowing.     Extravascular findings:  The upper lungs appear grossly clear. No evidence of pharyngeal or  laryngeal mass lesion. Unremarkable appearance of the thyroid gland. No  cervical adenopathy. Multilevel degenerative disc disease in the mid and  lower cervical spine without acute osseous findings.             Impression:      Grossly symmetric cerebral perfusion with mild delayed perfusion in the  bilateral watershed regions including small region of Tmax >6 seconds (7  mm) in the left corona radiata, without evidence of core infarct.     Essentially normal CTA of the head.     Moderate atherosclerotic narrowing of the origin of the right ICA by  approximately 75% per NASCET criteria.     Severe narrowing of the origin of the right external carotid artery.     This report was finalized on 8/1/2022 11:58 AM by Kev Wright MD.       CT Angiogram Neck [[id number]] Collected: 08/01/22 1129     Updated:  08/01/22 1202    Narrative:      DATE OF EXAM: 8/1/2022 10:50 AM     PROCEDURE: CT CEREBRAL PERFUSION W WO CONTRAST-, CT ANGIOGRAM NECK-, CT  ANGIOGRAM HEAD W AI ANALYSIS OF LVO-     INDICATIONS: Neuro deficit, acute, stroke suspected     COMPARISON: Concurrent noncontrast CT of the head     TECHNIQUE: Routine transaxial cuts were obtained through the head  without administration of contrast. Routine transaxial cuts were then  obtained through the head following the intravenous administration of  115 mL of Isovue 370. Core blood volume, core blood flow, mean transit  time, and Tmax images were obtained utilizing the Rapid software  protocol. A limited CT angiogram of the head was also performed to  measure the blood vessel density.      CTA of the head and CTA of the neck was performed after the intravenous  administration of above contrast. Reconstructed coronal and sagittal  images were also obtained. In addition, a 3-D volume rendered image was  obtained after post processing. Automated exposure control and iterative  reconstruction methods were used. AI analysis of LVO was utilized for  the CTA Head imaging portion of the study.      The radiation dose reduction device was turned on for each scan per the  ALARA (As Low as Reasonably Achievable) protocol.     Stenosis measurement was performed by the NASCET or similar method.     FINDINGS:   CT cerebral perfusion:  There is grossly symmetric cerebral perfusion with mild delayed  perfusion in the bilateral watershed regions, including a small region  of Tmax >6 seconds (7 mL) in the left corona radiata. No evidence of  core infarct.     CTA HEAD:  No abrupt cut off/large vessel occlusion, flow-limiting stenosis,  dissection, or aneurysm. Mild atherosclerosis in the left greater than  right carotid siphons without flow-limiting stenosis or significant  luminal irregularity. Diminutive right P1 segment with mostly fetal  origin of the right PCA. Diminutive  appearance of the right V4 segment  beyond the PICA branch. The cerebral veins and dural venous sinuses are  grossly patent.     CTA NECK:  Normal appearance of the bilateral common carotid arteries.      There is soft and calcified atherosclerotic plaque at the right carotid  bifurcation origin of the right ICA which results focal narrowing of the  ICA origin measuring approximately 75% per NASCET criteria (series 10  image 169, series 909 image 43, series 907 image 10). There is torturous  course of the mid right ICA with otherwise unremarkable appearance of  the remaining right ICA. There is severe/high-grade narrowing of the  origin of the right external carotid artery with thread-like origin and  multiple small distal patent arterial collaterals (series 10 image 168).     There is soft and calcified atherosclerotic plaque at the left carotid  bifurcation and proximal left ICA with mild luminal irregularity,  without significant stenosis per NASCET criteria (<50% narrowing)  (series 10 image 167, series 906 image 8). There is torturous course of  the mid and distal left ICA with otherwise unremarkable appearance.     Dominant left vertebral artery system, without evidence of abrupt cut  off/large vessel occlusion, flow-limiting stenosis, dissection, or  aneurysm of the vertebral arteries.     Mild atherosclerosis of the thoracic aorta and aortic arch branch  vessels without significant luminal narrowing.     Extravascular findings:  The upper lungs appear grossly clear. No evidence of pharyngeal or  laryngeal mass lesion. Unremarkable appearance of the thyroid gland. No  cervical adenopathy. Multilevel degenerative disc disease in the mid and  lower cervical spine without acute osseous findings.             Impression:      Grossly symmetric cerebral perfusion with mild delayed perfusion in the  bilateral watershed regions including small region of Tmax >6 seconds (7  mm) in the left corona radiata, without  evidence of core infarct.     Essentially normal CTA of the head.     Moderate atherosclerotic narrowing of the origin of the right ICA by  approximately 75% per NASCET criteria.     Severe narrowing of the origin of the right external carotid artery.     This report was finalized on 8/1/2022 11:58 AM by Kev Wright MD.       XR Chest 1 View [[id number]] Collected: 08/01/22 1146     Updated: 08/01/22 1150    Narrative:      DATE OF EXAM: 8/1/2022 11:02 AM     PROCEDURE: XR CHEST 1 VW-     INDICATIONS: Acute Stroke Protocol (onset < 12 hrs); I63.9-Cerebral  infarction, unspecified; R53.1-Weakness     COMPARISON: 12/31/2016     TECHNIQUE: Single radiographic AP view of the chest was obtained.     FINDINGS:  Sternotomy wires are noted. A cardiac pacemaker device is present. There  is no sarita pulmonary consolidation. There probably are some chronic  changes at the lung bases.        Impression:      1.  Some suggested chronic changes at the lung bases.     This report was finalized on 8/1/2022 11:47 AM by Rolando Solorzano MD.       CT Head Without Contrast Stroke Protocol [[id number]] Collected: 08/01/22 1048     Updated: 08/01/22 1054    Narrative:      DATE OF EXAM: 8/1/2022 10:42 AM     PROCEDURE: CT HEAD WO CONTRAST STROKE PROTOCOL-     INDICATIONS: Neuro deficit, acute, stroke suspected     COMPARISON: 04/18/2009     TECHNIQUE: Routine transaxial and coronal reconstruction images were  obtained through the head without the administration of contrast.  Automated exposure control and iterative reconstruction methods were  used.        FINDINGS:  The ventricles are not dilated. There is no underlying hemorrhage. There  are no abnormal extra-axial fluid collections. There is no midline  shift. The paranasal sinuses seem relatively clear. There is minimal  mucosal thickening in an anterior ethmoid air cell on the left. This is  unchanged. There is some cerebral atrophy not unexpected for the  patient's age.         Impression:      1.  An acute intracranial abnormality is not appreciated.     . Study was reviewed and discussed with team navigator at 10:42 AM.     This report was finalized on 8/1/2022 10:51 AM by Rolando Solorzano MD.           ECG/EMG Results (last 24 hours)     Procedure Component Value Units Date/Time    ECG 12 Lead [[id number]] Collected: 08/01/22 1114     Updated: 08/01/22 1139     QT Interval 454 ms      QTC Interval 510 ms     Narrative:      Test Reason : Acute Stroke Protocol (onset < 12 hrs)  Blood Pressure :   */*   mmHG  Vent. Rate :  76 BPM     Atrial Rate :  76 BPM     P-R Int : 132 ms          QRS Dur : 150 ms      QT Int : 454 ms       P-R-T Axes :   * 245  77 degrees     QTc Int : 510 ms    AV dual-paced rhythm with frequent premature ventricular complexes  Biventricular pacemaker detected  Abnormal ECG  When compared with ECG of 31-DEC-2016 06:49,  Electronic ventricular pacemaker has replaced Sinus rhythm  Confirmed by BAMBI RAPHAEL MD ([id number]) on 8/1/2022 11:39:41 AM    Referred By: EDMD           Confirmed By: BAMBI RAPHAEL MD          I reviewed the patient's new laboratory and imaging results.  I independently reviewed the patient's new images.  Assessment & Plan   A / P     Mr. Lion is a 73yo M with a history of CAD s/p CABG, HTN, HLD, COPD, Tobacco use, Afib (no anticoagulation), Ischemic cardiomyopathy s/p ICD placement and chronic systolic and diastolic heart failure (EF 20% and Grade III diastolic dysfunction on echo from 2016) who presented to the [facility] ED with acute onset of right upper extremity weakness and inability to use his right leg. CT head was performed and showed no acute intracranial abnormality. He was given TNK. CT perfusion and CTA of the head and neck were unremarkable. He will be admitted to the ICU for ongoing care.       Nutrition:   NPO Diet NPO Type: Strict NPO  Advance Directives:   There are no questions and answers to display.       Active Hospital Problems     Diagnosis    • **Acute CVA (cerebrovascular accident) ([facility])    • Ischemic cardiomyopathy      · Echocardiogram (08/10/2016): LVEF 20%. Grade III diastolic dysfunction.     • COPD (chronic obstructive pulmonary disease) ([facility])    • Tobacco abuse    • Essential hypertension    • Coronary artery disease involving native coronary artery of native heart without angina pectoris      · Cardiac catheterization for NSTEMI (08/2016). Patent stents in LM and Cx. Patent LIMA.   RCA with collaterals  · S/P CABG Data deficit.         Assessment / Plan:    1. Admit to Neuro ICU  2. Neurochecks  3. Repeat CT head at 24 hours post-TNK  4. MRI brain if ICD compatible.   5. Echo with bubble  6. Cardene drip for SBP > 180.   7. High dose statin  8. PT/OT once off bedrest.   9. Dysphagia evaluation  10. Smoking cessation. Patient is not interested in quitting smoking. He declines a nicotine patch.   11. AM labs    High risk secondary to acute CVA s/p TNK with the need for intensive monitoring for neurologic changes.     Plan of care and goals reviewed during interdisciplinary rounds.  I discussed the patient's findings and my recommendations with patient and nursing staff      Kate Garcia, DO  Pulmonary and Critical Care Medicine

## 2022-08-01 NOTE — PLAN OF CARE
Problem: Adult Inpatient Plan of Care  Goal: Plan of Care Review  Outcome: Ongoing, Progressing  Flowsheets (Taken 8/1/2022 1715)  Progress: improving  Plan of Care Reviewed With: patient   Goal Outcome Evaluation:  Plan of Care Reviewed With: patient        Progress: improving   Patient alert and oriented. Able to move bilateral upper extremities and LLE. He can wiggle right leg and slightly lift off bed. He complains of continuous numbness in right leg. His NIH is 5. Remains on RA, denies pain. Voids per urinal. Afebrile.

## 2022-08-01 NOTE — ED PROVIDER NOTES
EMERGENCY DEPARTMENT ENCOUNTER    Pt Name: Cholo Lion  MRN: [id number]  Pt :   1950  Room Number:    Date of encounter:  2022  PCP: Mayra Brewer APRN  ED Provider: Donavon Walker MD    Historian: Patient, EMS      HPI:  Chief Complaint: Right-sided weakness        Context: Cholo Lion is a 72 y.o. male who presents to the ED c/o sudden onset of right-sided weakness starting at 915 this morning.  911 was called within 5 minutes of symptom onset.  The patient had been up and around and doing fine up until this point.  He notes significant right arm and leg weakness.  His arm has gained some strength back but his leg remains quite weak.  He denies history of CVA.  He denies internal bleeding.      PAST MEDICAL HISTORY  Past Medical History:   Diagnosis Date   • Bundle branch block, right    • Cancer (HCC)    • Cardiomyopathy (HCC)    • Chest pain    • CHF (congestive heart failure) ([facility])    • COPD (chronic obstructive pulmonary disease) ([facility]) 2016   • Coronary artery disease    • Heart attack (HCC)    • History of shingles    • Hyperlipidemia    • Hypertension    • Lung nodules 2016   • Tremors of nervous system          PAST SURGICAL HISTORY  Past Surgical History:   Procedure Laterality Date   • BIVENTRICULLAR IMPLANTABLE CARDIOVERTER DEFIBRILLATOR PLACEMENT     • CARDIAC CATHETERIZATION N/A 8/10/2016    Procedure: Left Heart Cath;  Surgeon: Prabhjot Lala MD;  Location:  [facility];  Service:    • CARDIAC CATHETERIZATION N/A 2019    Procedure: Left Heart Cath;  Surgeon: Prabhjot Lala MD;  Location:  [facility];  Service: Cardiovascular   • CARDIAC ELECTROPHYSIOLOGY PROCEDURE N/A 2016    Procedure: Implant ICD - bi ventricular;  Surgeon: Espinoza Benjamin DO;  Location:  [facility] EP [facility];  Service:    • COLON SURGERY  2016   • CORONARY ARTERY BYPASS GRAFT     • LIPOMA  EXCISION      abdomen   • OTHER SURGICAL HISTORY      heart stents         FAMILY HISTORY  Family History   Problem Relation Age of Onset   • Heart attack Brother    • No Known Problems Mother    • No Known Problems Father    • Clotting disorder Sister         clot traveled to heart         SOCIAL HISTORY  Social History     Socioeconomic History   • Marital status:    Tobacco Use   • Smoking status: Current Every Day Smoker     Packs/day: 0.50     Types: Cigarettes   • Smokeless tobacco: Never Used   Substance and Sexual Activity   • Alcohol use: Yes     Comment: OCCASIONAL; liquor or beer only on occasion   • Drug use: No   • Sexual activity: Defer         ALLERGIES  Bactrim [sulfamethoxazole-trimethoprim], Lopressor [metoprolol tartrate], and Neosporin [neomycin-bacitracin zn-polymyx]        REVIEW OF SYSTEMS  Review of Systems       All systems reviewed and negative except for those discussed in HPI.       PHYSICAL EXAM    I have reviewed the triage vital signs and nursing notes.    ED Triage Vitals   Temp Pulse Resp BP SpO2   -- -- -- -- --      Temp src Heart Rate Source Patient Position BP Location FiO2 (%)   -- -- -- -- --       Physical Exam  GENERAL:   Appears somewhat anxious as I greet him upon arrival to the emergency department on the EMS stretcher.  HENT: Nares patent.  EYES: No scleral icterus.  CV: Regular rhythm, regular rate.  No murmurs gallops rubs  RESPIRATORY: Normal effort.  No audible wheezes, rales or rhonchi.  Clear to auscultation  ABDOMEN: Soft, nontender  MUSCULOSKELETAL: No deformities.   NEURO: Alert, oriented.  Bilateral upper extremity tremor with right pronator drift.  Marked right leg weakness with drift to bed within 1 second of elevation.  No drift on left.  See stroke navigator note for formal NIH.  SKIN: Warm, dry, no rash visualized.        LAB RESULTS  Recent Results (from the past 24 hour(s))   POC Protime / INR    Collection Time: 08/01/22 10:56 AM    Specimen:  Blood   Result Value Ref Range    Protime 11.1 (L) 12.8 - 15.2 seconds    INR 0.9 0.8 - 1.2   POC CHEM 8    Collection Time: 08/01/22 10:58 AM    Specimen: Blood   Result Value Ref Range    Glucose 86 70 - 130 mg/dL    BUN 16 8 - 26 mg/dL    Creatinine 1.00 0.60 - 1.30 mg/dL    Sodium 140 138 - 146 mmol/L    POC Potassium 5.2 (H) 3.5 - 4.9 mmol/L    Chloride 102 98 - 109 mmol/L    Total CO2 29 24 - 29 mmol/L    Hemoglobin 14.6 12.0 - 17.0 g/dL    Hematocrit 43 38 - 51 %    Ionized Calcium 1.20 1.20 - 1.32 mmol/L    eGFR 80.0 >60.0 mL/min/1.73   Lavender Top    Collection Time: 08/01/22 11:04 AM   Result Value Ref Range    Extra Tube hold for add-on    CBC Auto Differential    Collection Time: 08/01/22 11:04 AM    Specimen: Blood   Result Value Ref Range    WBC 10.22 3.40 - 10.80 10*3/mm3    RBC 4.74 4.14 - 5.80 10*6/mm3    Hemoglobin 14.2 13.0 - 17.7 g/dL    Hematocrit 43.8 37.5 - 51.0 %    MCV 92.4 79.0 - 97.0 fL    MCH 30.0 26.6 - 33.0 pg    MCHC 32.4 31.5 - 35.7 g/dL    RDW 13.1 12.3 - 15.4 %    RDW-SD 44.8 37.0 - 54.0 fl    MPV 10.8 6.0 - 12.0 fL    Platelets 232 140 - 450 10*3/mm3    Neutrophil % 63.2 42.7 - 76.0 %    Lymphocyte % 24.7 19.6 - 45.3 %    Monocyte % 8.0 5.0 - 12.0 %    Eosinophil % 3.0 0.3 - 6.2 %    Basophil % 0.6 0.0 - 1.5 %    Immature Grans % 0.5 0.0 - 0.5 %    Neutrophils, Absolute 6.46 1.70 - 7.00 10*3/mm3    Lymphocytes, Absolute 2.52 0.70 - 3.10 10*3/mm3    Monocytes, Absolute 0.82 0.10 - 0.90 10*3/mm3    Eosinophils, Absolute 0.31 0.00 - 0.40 10*3/mm3    Basophils, Absolute 0.06 0.00 - 0.20 10*3/mm3    Immature Grans, Absolute 0.05 0.00 - 0.05 10*3/mm3    nRBC 0.0 0.0 - 0.2 /100 WBC   Scan Slide    Collection Time: 08/01/22 11:04 AM    Specimen: Blood   Result Value Ref Range    RBC Morphology Normal Normal    WBC Morphology Normal Normal    Platelet Morphology Normal Normal   ECG 12 Lead    Collection Time: 08/01/22 11:14 AM   Result Value Ref Range    QT Interval 454 ms    QTC Interval  510 ms   Troponin    Collection Time: 08/01/22 11:49 AM    Specimen: Blood   Result Value Ref Range    Troponin T <0.010 0.000 - 0.030 ng/mL   aPTT    Collection Time: 08/01/22 11:49 AM    Specimen: Blood   Result Value Ref Range    PTT 29.6 22.0 - 39.0 seconds   AST    Collection Time: 08/01/22 11:49 AM    Specimen: Blood   Result Value Ref Range    AST (SGOT) 11 1 - 40 U/L   ALT    Collection Time: 08/01/22 11:49 AM    Specimen: Blood   Result Value Ref Range    ALT (SGPT) 8 1 - 41 U/L       If labs were ordered, I independently reviewed the results.        RADIOLOGY  XR Chest 1 View    Result Date: 8/1/2022  DATE OF EXAM: 8/1/2022 11:02 AM  PROCEDURE: XR CHEST 1 VW-  INDICATIONS: Acute Stroke Protocol (onset < 12 hrs); I63.9-Cerebral infarction, unspecified; R53.1-Weakness  COMPARISON: 12/31/2016  TECHNIQUE: Single radiographic AP view of the chest was obtained.  FINDINGS: Sternotomy wires are noted. A cardiac pacemaker device is present. There is no sarita pulmonary consolidation. There probably are some chronic changes at the lung bases.      1.  Some suggested chronic changes at the lung bases.  This report was finalized on 8/1/2022 11:47 AM by Rolando Solorzano MD.      CT Head Without Contrast Stroke Protocol    Result Date: 8/1/2022  DATE OF EXAM: 8/1/2022 10:42 AM  PROCEDURE: CT HEAD WO CONTRAST STROKE PROTOCOL-  INDICATIONS: Neuro deficit, acute, stroke suspected  COMPARISON: 04/18/2009  TECHNIQUE: Routine transaxial and coronal reconstruction images were obtained through the head without the administration of contrast. Automated exposure control and iterative reconstruction methods were used.   FINDINGS: The ventricles are not dilated. There is no underlying hemorrhage. There are no abnormal extra-axial fluid collections. There is no midline shift. The paranasal sinuses seem relatively clear. There is minimal mucosal thickening in an anterior ethmoid air cell on the left. This is unchanged. There is some  cerebral atrophy not unexpected for the patient's age.      1.  An acute intracranial abnormality is not appreciated.  . Study was reviewed and discussed with team navigator at 10:42 AM.  This report was finalized on 8/1/2022 10:51 AM by Rolando Solorzano MD.      CT CEREBRAL PERFUSION WITH & WITHOUT CONTRAST, CT Angiogram Head w AI Analysis of LVO, CT Angiogram Neck    Result Date: 8/1/2022  DATE OF EXAM: 8/1/2022 10:50 AM  PROCEDURE: CT CEREBRAL PERFUSION W WO CONTRAST-, CT ANGIOGRAM NECK-, CT ANGIOGRAM HEAD W AI ANALYSIS OF LVO-  INDICATIONS: Neuro deficit, acute, stroke suspected  COMPARISON: Concurrent noncontrast CT of the head  TECHNIQUE: Routine transaxial cuts were obtained through the head without administration of contrast. Routine transaxial cuts were then obtained through the head following the intravenous administration of 115 mL of Isovue 370. Core blood volume, core blood flow, mean transit time, and Tmax images were obtained utilizing the Rapid software protocol. A limited CT angiogram of the head was also performed to measure the blood vessel density.  CTA of the head and CTA of the neck was performed after the intravenous administration of above contrast. Reconstructed coronal and sagittal images were also obtained. In addition, a 3-D volume rendered image was obtained after post processing. Automated exposure control and iterative reconstruction methods were used. AI analysis of LVO was utilized for the CTA Head imaging portion of the study.  The radiation dose reduction device was turned on for each scan per the ALARA (As Low as Reasonably Achievable) protocol.  Stenosis measurement was performed by the NASCET or similar method.  FINDINGS: CT cerebral perfusion: There is grossly symmetric cerebral perfusion with mild delayed perfusion in the bilateral watershed regions, including a small region of Tmax >6 seconds (7 mL) in the left corona radiata. No evidence of core infarct.  CTA HEAD: No abrupt cut  off/large vessel occlusion, flow-limiting stenosis, dissection, or aneurysm. Mild atherosclerosis in the left greater than right carotid siphons without flow-limiting stenosis or significant luminal irregularity. Diminutive right P1 segment with mostly fetal origin of the right PCA. Diminutive appearance of the right V4 segment beyond the PICA branch. The cerebral veins and dural venous sinuses are grossly patent.  CTA NECK: Normal appearance of the bilateral common carotid arteries.  There is soft and calcified atherosclerotic plaque at the right carotid bifurcation origin of the right ICA which results focal narrowing of the ICA origin measuring approximately 75% per NASCET criteria (series 10 image 169, series 909 image 43, series 907 image 10). There is torturous course of the mid right ICA with otherwise unremarkable appearance of the remaining right ICA. There is severe/high-grade narrowing of the origin of the right external carotid artery with thread-like origin and multiple small distal patent arterial collaterals (series 10 image 168).  There is soft and calcified atherosclerotic plaque at the left carotid bifurcation and proximal left ICA with mild luminal irregularity, without significant stenosis per NASCET criteria (<50% narrowing) (series 10 image 167, series 906 image 8). There is torturous course of the mid and distal left ICA with otherwise unremarkable appearance.  Dominant left vertebral artery system, without evidence of abrupt cut off/large vessel occlusion, flow-limiting stenosis, dissection, or aneurysm of the vertebral arteries.  Mild atherosclerosis of the thoracic aorta and aortic arch branch vessels without significant luminal narrowing.  Extravascular findings: The upper lungs appear grossly clear. No evidence of pharyngeal or laryngeal mass lesion. Unremarkable appearance of the thyroid gland. No cervical adenopathy. Multilevel degenerative disc disease in the mid and lower cervical  spine without acute osseous findings.        Grossly symmetric cerebral perfusion with mild delayed perfusion in the bilateral watershed regions including small region of Tmax >6 seconds (7 mm) in the left corona radiata, without evidence of core infarct.  Essentially normal CTA of the head.  Moderate atherosclerotic narrowing of the origin of the right ICA by approximately 75% per NASCET criteria.  Severe narrowing of the origin of the right external carotid artery.  This report was finalized on 8/1/2022 11:58 AM by Kev Wright MD.        I ordered and reviewed the above noted radiographic studies.      I viewed images of CT head which showed no evidence of bleed or acute ischemia per my independent interpretation.    See radiologist's dictation for official interpretation.        PROCEDURES    Critical Care  Performed by: Donavon Walker MD  Authorized by: Donavon Walker MD     Critical care provider statement:     Critical care time (minutes):  35    Critical care time was exclusive of:  Separately billable procedures and treating other patients    Critical care was necessary to treat or prevent imminent or life-threatening deterioration of the following conditions:  CNS failure or compromise    Critical care was time spent personally by me on the following activities:  Ordering and performing treatments and interventions, ordering and review of laboratory studies, ordering and review of radiographic studies, pulse oximetry, re-evaluation of patient's condition, review of old charts, obtaining history from patient or surrogate, examination of patient, evaluation of patient's response to treatment, discussions with consultants and development of treatment plan with patient or surrogate        ECG 12 Lead   Final Result   Test Reason : Acute Stroke Protocol (onset < 12 hrs)   Blood Pressure :   */*   mmHG   Vent. Rate :  76 BPM     Atrial Rate :  76 BPM      P-R Int : 132 ms          QRS Dur : 150 ms        QT Int : 454 ms       P-R-T Axes :   * 245  77 degrees      QTc Int : 510 ms      AV dual-paced rhythm with frequent premature ventricular complexes   Biventricular pacemaker detected   Abnormal ECG   When compared with ECG of 31-DEC-2016 06:49,   Electronic ventricular pacemaker has replaced Sinus rhythm   Confirmed by BAMBI RAPHAEL MD ([id number]) on 8/1/2022 11:39:41 AM      Referred By: EDMD           Confirmed By: BAMBI RAPHAEL MD          MEDICATIONS GIVEN IN ER    Medications   sodium chloride 0.9 % flush 10 mL (has no administration in time range)   sodium chloride 0.9 % infusion (has no administration in time range)   sodium chloride 0.9 % flush 10 mL (10 mL Intravenous Given 8/1/22 1051)   tenecteplase for stroke (TNKASE) injection 25 mg (25 mg Intravenous Given 8/1/22 1051)   sodium chloride 0.9 % flush 10 mL (10 mL Intravenous Given 8/1/22 1052)   iopamidol (ISOVUE-370) 76 % injection 150 mL (115 mL Intravenous Given 8/1/22 1056)         PROGRESS, DATA ANALYSIS, CONSULTS, AND MEDICAL DECISION MAKING    All labs have been independently reviewed by me.  All radiology studies have been reviewed by me and the radiologist dictating the report.   EKG's have been independently viewed and interpreted by me.      Differential diagnoses: Probable acute CVA.      ED Course as of 08/01/22 1246   Mon Aug 01, 2022   1047 TNK ordered by me after thorough discussion of risk/benefit with patient. [MS]   1139 The patient does not meet criteria for going to the catheterization lab.  I have paged the intensivist for admission to the ICU.  Patient is in stable status at this point.  No immediate complications to the TN K administration. [MS]      ED Course User Index  [MS] Bambi Raphael MD             AS OF 12:46 EDT VITALS:    BP - 126/73  HR - 74  TEMP - 97.8 °F (36.6 °C) (Oral)  O2 SATS - 96%                  DIAGNOSIS  Final diagnoses:   Acute CVA (cerebrovascular accident) (HCC)   Acute right-sided weakness          DISPOSITION  Admission           Donavon Walker MD  08/01/22 [id number]

## 2022-08-01 NOTE — CONSULTS
Stroke Consult Note    Patient Name: Cholo Lion   MRN: [id number]  Age: 72 y.o.  Sex: male  : 1950    Primary Care Physician: Mayra Brewer APRN  Referring Physician:  No ref. provider found        Handedness: Right  Race:      Chief Complaint/Reason for Consultation: right weakness, dysmetria, slight facial droop     HPI: 72 year old right handed white male with known medical diagnoses of CAD, s/p CABG, HTN, HLD, COPD, smoking, A. Fib-on no anticoagulation and pacemaker/defibrillator placement who presents with last known well this morning approximately 915 where he had an acute onset of right upper extremity weakness and incoordination as well as inability to use his right leg.  History is obtained from patient and his niece at the bedside.  They both say he was normal this morning had already been to town to do some shopping.  They were sitting at home he bent over to clean something behind his computer when he sat back up and tried to  his coffee he was unable to hold it with his right hand.  He then tried to walk and realized he was unable to use his right lower extremity.  Patient takes aspirin and Plavix at home.  His blood pressure on arrival is 124/58.  As patient was in the window for IV thrombolytics inclusion and exclusion criteria were reviewed and he did agree to tenecteplase.  Patient's initial NIH was a 7 he does have a slight right facial droop, right upper extremity drift and dysmetria, and right lower extremity unable to hold against gravity.    Last Known Normal Date/Time: 0915 EST     Review of Systems   Constitutional: Negative.    Respiratory: Positive for cough and shortness of breath.    Cardiovascular: Positive for palpitations.   Genitourinary: Negative.    Musculoskeletal: Negative.    Skin: Negative.    Neurological: Positive for tremors, facial asymmetry and numbness.   Psychiatric/Behavioral: Negative.       Past Medical History:    Diagnosis Date   • Bundle branch block, right    • Cancer (CMS/[facility])    • Cardiomyopathy (CMS/[facility])    • Chest pain    • CHF (congestive heart failure) (CMS/[facility])    • COPD (chronic obstructive pulmonary disease) (CMS/[facility]) 12/19/2016   • Coronary artery disease    • Heart attack (CMS/[facility]) 1993   • History of shingles    • Hyperlipidemia    • Hypertension    • Lung nodules 12/19/2016   • Tremors of nervous system      Past Surgical History:   Procedure Laterality Date   • BIVENTRICULLAR IMPLANTABLE CARDIOVERTER DEFIBRILLATOR PLACEMENT     • CARDIAC CATHETERIZATION N/A 8/10/2016    Procedure: Left Heart Cath;  Surgeon: Prabhjot Lala MD;  Location:  [facility];  Service:    • CARDIAC CATHETERIZATION N/A 11/13/2019    Procedure: Left Heart Cath;  Surgeon: Prabhjot Lala MD;  Location:  [facility];  Service: Cardiovascular   • CARDIAC ELECTROPHYSIOLOGY PROCEDURE N/A 12/30/2016    Procedure: Implant ICD - bi ventricular;  Surgeon: Espinoza Benjamin DO;  Location:  [facility] EP INVASIVE LOC[facility];  Service:    • COLON SURGERY  06/26/2016   • CORONARY ARTERY BYPASS GRAFT     • LIPOMA EXCISION      abdomen   • OTHER SURGICAL HISTORY      heart stents     Family History   Problem Relation Age of Onset   • Heart attack Brother    • No Known Problems Mother    • No Known Problems Father    • Clotting disorder Sister         clot traveled to heart     Social History     Socioeconomic History   • Marital status:    Tobacco Use   • Smoking status: Current Every Day Smoker     Packs/day: 0.50     Types: Cigarettes   • Smokeless tobacco: Never Used   Substance and Sexual Activity   • Alcohol use: Yes     Comment: OCCASIONAL; liquor or beer only on occasion   • Drug use: No   • Sexual activity: Defer     Allergies   Allergen Reactions   • Bactrim [Sulfamethoxazole-Trimethoprim]      UNKNOWN   • Lopressor [Metoprolol Tartrate]      hypotension   • Neosporin [Neomycin-Bacitracin  Zn-Polymyx] Rash     Prior to Admission medications    Medication Sig Start Date End Date Taking? Authorizing Provider   aspirin 325 MG tablet Take 325 mg by mouth Every Morning.    ProviderGenet MD   atorvastatin (LIPITOR) 20 MG tablet Take 1 tablet by mouth daily.  Patient taking differently: Take 20 mg by mouth Every Night. 8/11/16   Nigel Diaz PA   carvedilol (COREG) 3.125 MG tablet Take 1 tablet by mouth every 12 (twelve) hours. 8/11/16   Nigel Diaz PA   clopidogrel (PLAVIX) 75 MG tablet Take 1 tablet by mouth daily.  Patient taking differently: Take 75 mg by mouth Every Morning. 8/11/16   Nigel Diaz PA   nitroglycerin (NITROSTAT) 0.4 MG SL tablet Place 1 tablet under the tongue every 5 (five) minutes as needed for chest pain for up to 3 doses. Take no more than 3 doses in 15 minutes. 8/11/16   Nigel Diaz PA   sacubitril-valsartan (ENTRESTO) 24-26 MG tablet Take 1 tablet by mouth 2 (Two) Times a Day.    Provider, MD Genet   spironolactone (ALDACTONE) 25 MG tablet Take 1 tablet by mouth daily.  Patient taking differently: Take 25 mg by mouth Every Morning. 8/11/16   Nigel Diaz PA          Neurological Exam  Mental Status  Awake, alert and oriented to person, place and time.Alert. Speech is normal. Language is fluent with no aphasia.    Cranial Nerves  CN II: Visual fields full to confrontation.  CN III, IV, VI: Extraocular movements intact bilaterally. Pupils equal round and reactive to light bilaterally.  CN V: Facial sensation is normal.  CN VII:  Right: There is central facial weakness.  CN VIII: Hearing appears intact.  CN IX, X: Palate elevates symmetrically  CN XI: Shoulder shrug strength is normal.  CN XII: Tongue midline without atrophy or fasciculations.    Motor    Right upper extremity drift  Right lower extremity 1/5.    Sensory  Light touch abnormality: Sensation: Decreased sensation in the right upper and lower extremity to light touch.      Coordination  Right: Finger-to-nose abnormality:  Patient has obvious essential tremor in bilateral upper extremity, however has dysmetria on finger-to-nose in the right upper extremity.    Gait    Not observed.      Physical Exam  Vitals reviewed.   Constitutional:       Appearance: Normal appearance.   HENT:      Head: Normocephalic.      Mouth/Throat:      Mouth: Mucous membranes are dry.   Eyes:      Extraocular Movements: Extraocular movements intact.      Pupils: Pupils are equal, round, and reactive to light.   Pulmonary:      Effort: Pulmonary effort is normal.   Neurological:      Mental Status: He is alert.      Cranial Nerves: Cranial nerve deficit present.      Sensory: Sensory deficit present.      Motor: Weakness present.   Psychiatric:         Mood and Affect: Mood normal.         Speech: Speech normal.         Acute Stroke Data    IV Thrombolytic (TPA/Tenecteplase) Inclusion / Exclusion Criteria    Time: 10:56 EDT  Person Administering Scale: PIOTR Apple    Inclusion Criteria  [x]   18 years of age or greater   [x]   Onset of symptoms < 4.5 hours before beginning treatment (stroke onset = time patient was last seen well or without symptoms).   []   Diagnosis of acute ischemic stroke causing measurable disabling deficit (Complete Hemianopia, Any Aphasia, Visual or Sensory Extinction, Any weakness limiting sustained effort against gravity)   []   Any remaining deficit considered potentially disabling in view of patient and practitioner   Exclusion criteria (Do not proceed with Alteplase if any are checked under exclusion criteria)  []   Onset unknown or GREATER than 4.5 hours   []   ICH on CT/MRI   []   CT demonstrates hypodensity representing acute or subacute infarct   []   Significant head trauma or prior stroke in the previous 3 months   []   Symptoms suggestive of subarachnoid hemorrhage   []   History of un-ruptured intracranial aneurysm GREATER than 10 mm   []   Recent intracranial  or intraspinal surgery within the last 3 months   []   Arterial puncture at a non-compressible site in the previous 7 days   []   Active internal bleeding   []   Acute bleeding tendency   []   Platelet count LESS than 100,000 for known hematological diseases such as leukemia, thrombocytopenia or chronic cirrhosis   []   Current use of anticoagulant with INR GREATER than 1.7 or PT GREATER than 15 seconds, aPTT GREATER than 40 seconds   []   Heparin received within 48 hours, resulting in abnormally elevated aPTT GREATER than upper limit of normal   []   Current use of direct thrombin inhibitors or direct factor Xa inhibitors in the past 48 hours   []   Elevated blood pressure refractory to treatment (systolic GREATER than 185 mm/Hg or diastolic  GREATER than 110 mm/Hg   []   Suspected infective endocarditis and aortic arch dissection   []   Current use of therapeutic treatment dose of low-molecular-weight heparin (LMWH) within the previous 24 hours   []   Structural GI malignancy or bleed   Relative exclusion for all patients  []   Only minor non-disabling symptoms   []   Pregnancy   []   Seizure at onset with postictal residual neurological impairments   []   Major surgery or previous trauma within past 14 days   []   History of previous spontaneous ICH, intracranial neoplasm, or AV malformation   []   Postpartum (within previous 14 days)   []   Recent GI or urinary tract hemorrhage (within previous 21 days)   []   Recent acute MI (within previous 3 months)   []   History of un-ruptured intracranial aneurysm LESS than 10 mm   []   History of ruptured intracranial aneurysm   []   Blood glucose LESS than 50 mg/dL (2.7 mmol/L)   []   Dural puncture within the last 7 days   []   Known GREATER than 10 cerebral microbleeds   Additional exclusions for patients with symptoms onset between 3 and 4.5 hours.  []   Age > 80.   []   On any anticoagulants regardless of INR  >>> Warfarin (Coumadin), Heparin, Enoxaparin (Lovenox),  fondaparinux (Arixtra), bivalirudin (Angiomax), Argatroban, dabigatran (Pradaxa), rivaroxaban (Xarelto), or apixaban (Eliquis)   []   Severe stroke (NIHSS > 25).   []   History of BOTH diabetes and previous ischemic stroke.   [x]   The risks and benefits have been discussed with the patient or family related to the administration of IV Alteplase for stroke symptoms.   [x]   I have discussed and reviewed the patient's case and imaging with the attending prior to IV Thrombolytic (TPA/Tenecteplase).   1051 Time Thrombolytic administered       Hospital Meds:  Scheduled- sodium chloride, 10 mL, Intravenous, Once      Infusions-     PRNs- •  sodium chloride    Functional Status Prior to Current Stroke/Kinga Score: 0    NIH Stroke Scale  Time: 10:56 EDT  Person Administering Scale: PIOTR Apple    Interval: baseline  1a. Level of Consciousness: 0-->Alert, keenly responsive  1b. LOC Questions: 0-->Answers both questions correctly  1c. LOC Commands: 0-->Performs both tasks correctly  2. Best Gaze: 0-->Normal  3. Visual: 0-->No visual loss  4. Facial Palsy: 1-->Minor paralysis (flattened nasolabial fold, asymmetry on smiling)  5a. Motor Arm, Left: 0-->No drift, limb holds 90 (or 45) degrees for full 10 secs  5b. Motor Arm, Right: 1-->Drift, limb holds 90 (or 45) degrees, but drifts down before full 10 secs, does not hit bed or other support  6a. Motor Leg, Left: 0-->No drift, leg holds 30 degree position for full 5 secs  6b. Motor Leg, Right: 3-->No effort against gravity, leg falls to bed immediately  7. Limb Ataxia: 1-->Present in one limb  8. Sensory: 1-->Mild-to-moderate sensory loss, patient feels pinprick is less sharp or is dull on the affected side, or there is a loss of superficial pain with pinprick, but patient is aware of being touched  9. Best Language: 0-->No aphasia, normal  10. Dysarthria: 0-->Normal  11. Extinction and Inattention (formerly Neglect): 0-->No abnormality    Total (NIH Stroke Scale):  7    Results Reviewed:  I have personally reviewed current lab, radiology, and data and agree with results.    Results for orders placed during the hospital encounter of 08/09/16    Adult transthoracic echo complete with contrast    Interpretation Summary  · The left ventricular cavity is severely dilated.  · The findings are consistent with dilated cardiomyopathy.  · Left ventricular function is severely decreased. Estimated EF = 20%.  · Left ventricular diastolic dysfunction (grade III) consistent with reversible restrictive pattern.  · Left ventricular wall segments contract abnormally. Refer to wall scoring diagram for more information.    PRELIMINARY TECH FINDINGS ONLY    CT head shows no acute intracranial abnormality    CTA head and neck and perfusion shows small area of decreased perfusion with no large vessel occlusion    Assessment/Plan:      72-year-old with known stroke risk factors of coronary artery disease, hyperlipidemia, A. fib, and smoking who presents with acute onset of right upper and lower extremity weakness with some dysmetria, decreased sensation, and slight facial droop.  Initial NIH is a 7, patient was within the window for IV tenecteplase and did agree to medication.  CTA head neck and perfusion do show a small area of perfusion deficit however no large vessel amenable to intervention.      1.  Left hemisphere infarct-patient will be admitted to the ICU status post TN K.  Stroke order set with thrombolytic therapy initiated.  We will get MRI if his pacemaker/defibrillator are compatible.  He will get a 24-hour head CT on 8/2 approximately 11 AM.  TTE with bubble study.     2.  Hypertension-patient's blood pressure is actually normotensive upon arrival.  Okay to allow for autoregulation of his blood pressure, may use Cardene drip if his systolic blood pressure is greater than 180 at any point.    3.  Hyperlipidemia-high intensity statin, check fasting lipid profile in the a.m. and adjust  that dose as needed.    4.  Diet-should be n.p.o. until passes bedside swallow then may have heart healthy diet.    5.  Activity-we will be on bedrest throughout the day status post TNK.  Okay for PT/OT to evaluate and treat in the a.m.    6.  Left ICA stenosis-Per CTA left ICA approximately 75% stenosed.  We will get carotid ultrasound to verify these results.      Greater than 60 minutes was spent in assessing patient as well as coordinating care with ED staff, and pharmacy, as well as obtaining and reviewing imaging.  Discussed results and plan with ED physician, Dr. Walker who will notify the intensivist of admission, as well as patient and family member at bedside.  All questions answered.  Stroke neurology will continue to follow patient.  Thank you for allowing us to participate in his patient's care.    Citlaly Husain DNP, APRN, AGCNS-BC  August 1, 2022  10:56 EDT

## 2022-08-01 NOTE — THERAPY EVALUATION
Acute Care - Speech Language Pathology Initial Evaluation   [place]   Cognitive-Communication Evaluation       Patient Name: Cholo Lion  : 1950  MRN: [id number]  Today's Date: 2022               Admit Date: 2022     Visit Dx:    ICD-10-CM ICD-9-CM   1. Acute CVA (cerebrovascular accident) ([facility])  I63.9 434.91   2. Acute right-sided weakness  R53.1 728.87     Patient Active Problem List   Diagnosis   • Coronary artery disease involving native coronary artery of native heart without angina pectoris   • Essential hypertension   • Hyperlipidemia LDL goal <70   • Tobacco abuse   • Precordial pain   • Lung nodules   • COPD (chronic obstructive pulmonary disease) ([facility])   • Ischemic cardiomyopathy   • RBBB   • PVC's (premature ventricular contractions)   • Cardiac resynchronization therapy defibrillator (CRT-D) in place   • Abnormal stress test   • Acute CVA (cerebrovascular accident) ([facility])     Past Medical History:   Diagnosis Date   • Bundle branch block, right    • Cancer ([facility])    • Cardiomyopathy ([facility])    • Chest pain    • CHF (congestive heart failure) ([facility])    • COPD (chronic obstructive pulmonary disease) ([facility]) 2016   • Coronary artery disease    • Heart attack ([facility])    • History of shingles    • Hyperlipidemia    • Hypertension    • Lung nodules 2016   • Tremors of nervous system      Past Surgical History:   Procedure Laterality Date   • BIVENTRICULLAR IMPLANTABLE CARDIOVERTER DEFIBRILLATOR PLACEMENT     • CARDIAC CATHETERIZATION N/A 8/10/2016    Procedure: Left Heart Cath;  Surgeon: Prabhjot Lala MD;  Location:  [facility];  Service:    • CARDIAC CATHETERIZATION N/A 2019    Procedure: Left Heart Cath;  Surgeon: Prabhjot Lala MD;  Location:  [facility];  Service: Cardiovascular   • CARDIAC ELECTROPHYSIOLOGY PROCEDURE N/A 2016    Procedure: Implant ICD - bi ventricular;  Surgeon: Espinoza Benjamin DO;   Location: [facility] INVASIVE LOCATION;  Service:    • COLON SURGERY  06/26/2016   • CORONARY ARTERY BYPASS GRAFT     • LIPOMA EXCISION      abdomen   • OTHER SURGICAL HISTORY      heart stents       SLP Recommendation and Plan              Anticipated Discharge Disposition (SLP): home (08/01/22 1500)                             Plan of Care Reviewed With: patient (08/01/22 1536)  Progress:  (eval) (08/01/22 1536)      SLP EVALUATION (last 72 hours)     SLP SLC Evaluation     Row Name 08/01/22 1500                   Communication Assessment/Intervention    Document Type evaluation  -AV        Patient Observations alert;cooperative  -AV        Patient Effort good  -AV                  General Information    Patient Profile Reviewed yes  -AV        Pertinent History Of Current Problem CAD, CVA, CABG, COPD, HTN  -AV        Precautions/Limitations, Vision WFL;for purposes of eval  -AV        Precautions/Limitations, Hearing WFL;for purposes of eval  -AV        Prior Level of Function-Communication WFL  -AV        Plans/Goals Discussed with patient  -AV        Barriers to Rehab none identified  -AV        Patient's Goals for Discharge return to home  -AV                  Pain    Additional Documentation Pain Scale: FACES Pre/Post-Treatment (Group)  -AV                  Pain Scale: FACES Pre/Post-Treatment    Pain: FACES Scale, Pretreatment 0-->no hurt  -AV        Posttreatment Pain Rating 0-->no hurt  -AV                  Comprehension Assessment/Intervention    Comprehension Assessment/Intervention Auditory Comprehension  -AV                  Auditory Comprehension Assessment/Intervention    Auditory Comprehension (Communication) WFL  -AV                  Expression Assessment/Intervention    Expression Assessment/Intervention verbal expression  -AV                  Verbal Expression Assessment/Intervention    Verbal Expression WFL  -AV                  Oral Motor Structure and Function    Oral Motor Structure and  Function WFL  -AV        Dentition Assessment natural, present and adequate  -AV        Mucosal Quality moist, healthy  -AV                  Oral Musculature and Cranial Nerve Assessment    Oral Motor General Assessment WFL  -AV                  Motor Speech Assessment/Intervention    Motor Speech Function WFL  -AV                  Cursory Voice Assessment/Intervention    Quality and Resonance (Voice) WFL  -AV                  Cognitive Assessment Intervention- SLP    Cognitive Function (Cognition) WFL  -AV        Orientation Status (Cognition) WFL  -AV        Memory (Cognitive) WFL  -AV        Attention (Cognitive) WFL  -AV        Thought Organization (Cognitive) WFL  -AV        Reasoning (Cognitive) WFL  -AV        Problem Solving (Cognitive) WFL  -AV        Executive Function (Cognition) WFL  -AV                  Recommendations    Therapy Frequency (SLP SLC) evaluation only  -AV        Anticipated Discharge Disposition (SLP) home  -AV              User Key  (r) = Recorded By, (t) = Taken By, (c) = Cosigned By    Initials Name Effective Dates    AV CuadraWhitney Rodriguez, MS CCC-SLP 06/16/21 -                    EDUCATION  The patient has been educated in the following areas:     Cognitive Impairment Communication Impairment.    Patient was not wearing a face mask and did not exhibit coughing during this therapy encounter.  Procedure performed was not aerosolizing, involved close contact (within 6 feet for at least 15 minutes or longer), and did not involve contact with infectious secretions or specimens.  Therapist used appropriate personal protective equipment including gloves, standard procedure mask, eye protection.  Appropriate PPE was worn during the entire therapy session.  Hand hygiene was completed before and after therapy session.                     Time Calculation:      Time Calculation- SLP     Row Name 08/01/22 7739             Time Calculation- SLP    SLP Start Time 1530  -AV      SLP Received  On 08/01/22  -AV              Untimed Charges    SLP Eval/Re-eval  ST Eval Speech and Production w/ Language - 84201  -AV      28361-XG Eval Speech and Production w/ Language Minutes 38  -AV              Total Minutes    Untimed Charges Total Minutes 38  -AV       Total Minutes 38  -AV            User Key  (r) = Recorded By, (t) = Taken By, (c) = Cosigned By    Initials Name Provider Type    AV Whitney Benedict, MS CCC-SLP Speech and Language Pathologist                Therapy Charges for Today     Code Description Service Date Service Provider Modifiers Qty    [id number]  ST EVAL SPEECH AND PROD W LANG  3 8/1/2022 Whitney Benedict, MS CCC-SLP GN 1                     Whitney Staples MS CCC-SLP  8/1/2022

## 2022-08-01 NOTE — CASE MANAGEMENT/SOCIAL WORK
Discharge Planning Assessment  [facility]     Patient Name: Cholo Lion  MRN: [id number]  Today's Date: 8/1/2022    Admit Date: 8/1/2022     Discharge Needs Assessment     Row Name 08/01/22 1359       Living Environment    People in Home other relative(s)    Name(s) of People in Home Nessa Madrid--niece    Current Living Arrangements home    Potentially Unsafe Housing Conditions unable to assess    Primary Care Provided by self    Provides Primary Care For no one    Family Caregiver if Needed other relative(s)    Family Caregiver Names Nessa Madrid--niece       Resource/Environmental Concerns    Resource/Environmental Concerns none    Transportation Concerns none       Transition Planning    Patient/Family Anticipates Transition to home    Patient/Family Anticipated Services at Transition none    Transportation Anticipated family or friend will provide       Discharge Needs Assessment    Readmission Within the Last 30 Days no previous admission in last 30 days    Equipment Currently Used at Home none    Concerns to be Addressed no discharge needs identified    Equipment Needed After Discharge other (see comments)  TBD    Outpatient/Agency/Support Group Needs other (see comments)  TBD    Discharge Facility/Level of Care Needs other (see comments)  TBD               Discharge Plan     Row Name 08/01/22 8172       Plan    Plan IDP    Plan Comments MSW met with pt. and his niece Nessa Madrid at bedside. Pt. currently lives with his niece Nessa and her daughter in [facility]. Pt.’s PCP is Mayra Brewer. Pt.’s pharmacy [facility]. Pt. reports that he is independent at baseline with ADL’s/IADL’s. Pt. denies any DME, O2, or HH services currently. Pt. reports that he has transportation back home when he is medically ready to d/c. Pt. does not have an advanced directive or HCS documentation on file. Pt.’s goal is to return home. CM to follow throughout pt.’s stay.    Final  Discharge Disposition Code 30 - still a patient              Continued Care and Services - Admitted Since 8/1/2022    Coordination has not been started for this encounter.          Demographic Summary     Row Name 08/01/22 2466       General Information    Admission Type inpatient    Arrived From home    Referral Source admission list;emergency department    Reason for Consult discharge planning    Preferred Language English               Functional Status     Row Name 08/01/22 1356       Functional Status, IADL    Medications independent    Meal Preparation independent    Housekeeping independent    Laundry independent    Shopping independent    IADL Comments at baseline       Mental Status Summary    Recent Changes in Mental Status/Cognitive Functioning unable to assess       Employment/    Employment Status retired               Psychosocial    No documentation.                Abuse/Neglect    No documentation.                Legal    No documentation.                Substance Abuse    No documentation.                Patient Forms    No documentation.                   ADA Jennings

## 2022-08-01 NOTE — PLAN OF CARE
Goal Outcome Evaluation:  Plan of Care Reviewed With: patient        Progress:  (eval)       SLP evaluation completed. Will sign-off SLC . Please see note for further details and recommendations.

## 2022-08-02 ENCOUNTER — APPOINTMENT (OUTPATIENT)
Dept: MRI IMAGING | Facility: HOSPITAL | Age: 72
End: 2022-08-02

## 2022-08-02 ENCOUNTER — APPOINTMENT (OUTPATIENT)
Dept: CARDIOLOGY | Facility: HOSPITAL | Age: 72
End: 2022-08-02

## 2022-08-02 LAB
ANION GAP SERPL CALCULATED.3IONS-SCNC: 8 MMOL/L (ref 5–15)
BUN SERPL-MCNC: 10 MG/DL (ref 8–23)
BUN/CREAT SERPL: 8.8 (ref 7–25)
CALCIUM SPEC-SCNC: 8.5 MG/DL (ref 8.6–10.5)
CHLORIDE SERPL-SCNC: 112 MMOL/L (ref 98–107)
CHOLEST SERPL-MCNC: 105 MG/DL (ref 0–200)
CO2 SERPL-SCNC: 26 MMOL/L (ref 22–29)
CREAT SERPL-MCNC: 1.14 MG/DL (ref 0.76–1.27)
DEPRECATED RDW RBC AUTO: 45.5 FL (ref 37–54)
EGFRCR SERPLBLD CKD-EPI 2021: 68.3 ML/MIN/1.73
ERYTHROCYTE [DISTWIDTH] IN BLOOD BY AUTOMATED COUNT: 13.2 % (ref 12.3–15.4)
GLUCOSE BLDC GLUCOMTR-MCNC: 129 MG/DL (ref 70–130)
GLUCOSE SERPL-MCNC: 110 MG/DL (ref 65–99)
HBA1C MFR BLD: 5.6 % (ref 4.8–5.6)
HCT VFR BLD AUTO: 40.8 % (ref 37.5–51)
HDLC SERPL-MCNC: 31 MG/DL (ref 40–60)
HGB BLD-MCNC: 13.3 G/DL (ref 13–17.7)
LDLC SERPL CALC-MCNC: 46 MG/DL (ref 0–100)
LDLC/HDLC SERPL: 1.31 {RATIO}
MAGNESIUM SERPL-MCNC: 2 MG/DL (ref 1.6–2.4)
MCH RBC QN AUTO: 30.2 PG (ref 26.6–33)
MCHC RBC AUTO-ENTMCNC: 32.6 G/DL (ref 31.5–35.7)
MCV RBC AUTO: 92.5 FL (ref 79–97)
PHOSPHATE SERPL-MCNC: 3.5 MG/DL (ref 2.5–4.5)
PLATELET # BLD AUTO: 203 10*3/MM3 (ref 140–450)
PMV BLD AUTO: 10.6 FL (ref 6–12)
POTASSIUM SERPL-SCNC: 4.5 MMOL/L (ref 3.5–5.2)
RBC # BLD AUTO: 4.41 10*6/MM3 (ref 4.14–5.8)
SODIUM SERPL-SCNC: 146 MMOL/L (ref 136–145)
TRIGL SERPL-MCNC: 167 MG/DL (ref 0–150)
VLDLC SERPL-MCNC: 28 MG/DL (ref 5–40)
WBC NRBC COR # BLD: 11.86 10*3/MM3 (ref 3.4–10.8)

## 2022-08-02 PROCEDURE — 85027 COMPLETE CBC AUTOMATED: CPT | Performed by: INTERNAL MEDICINE

## 2022-08-02 PROCEDURE — 97166 OT EVAL MOD COMPLEX 45 MIN: CPT

## 2022-08-02 PROCEDURE — 93306 TTE W/DOPPLER COMPLETE: CPT

## 2022-08-02 PROCEDURE — 25010000002 SULFUR HEXAFLUORIDE MICROSPH 60.7-25 MG RECONSTITUTED SUSPENSION: Performed by: CLINICAL NURSE SPECIALIST

## 2022-08-02 PROCEDURE — 99233 SBSQ HOSP IP/OBS HIGH 50: CPT | Performed by: PSYCHIATRY & NEUROLOGY

## 2022-08-02 PROCEDURE — 80048 BASIC METABOLIC PNL TOTAL CA: CPT | Performed by: INTERNAL MEDICINE

## 2022-08-02 PROCEDURE — 84100 ASSAY OF PHOSPHORUS: CPT | Performed by: INTERNAL MEDICINE

## 2022-08-02 PROCEDURE — 99232 SBSQ HOSP IP/OBS MODERATE 35: CPT | Performed by: INTERNAL MEDICINE

## 2022-08-02 PROCEDURE — 70551 MRI BRAIN STEM W/O DYE: CPT

## 2022-08-02 PROCEDURE — 93880 EXTRACRANIAL BILAT STUDY: CPT

## 2022-08-02 PROCEDURE — 83735 ASSAY OF MAGNESIUM: CPT | Performed by: INTERNAL MEDICINE

## 2022-08-02 PROCEDURE — 80061 LIPID PANEL: CPT | Performed by: CLINICAL NURSE SPECIALIST

## 2022-08-02 PROCEDURE — 97161 PT EVAL LOW COMPLEX 20 MIN: CPT

## 2022-08-02 PROCEDURE — 83036 HEMOGLOBIN GLYCOSYLATED A1C: CPT | Performed by: CLINICAL NURSE SPECIALIST

## 2022-08-02 RX ORDER — ASPIRIN 325 MG
325 TABLET ORAL DAILY
Status: DISCONTINUED | OUTPATIENT
Start: 2022-08-03 | End: 2022-08-03 | Stop reason: HOSPADM

## 2022-08-02 RX ORDER — CARVEDILOL 3.12 MG/1
3.12 TABLET ORAL EVERY 12 HOURS SCHEDULED
Status: DISCONTINUED | OUTPATIENT
Start: 2022-08-02 | End: 2022-08-02

## 2022-08-02 RX ORDER — ASPIRIN 81 MG/1
81 TABLET, CHEWABLE ORAL DAILY
Status: DISCONTINUED | OUTPATIENT
Start: 2022-08-03 | End: 2022-08-02

## 2022-08-02 RX ORDER — CLOPIDOGREL BISULFATE 75 MG/1
75 TABLET ORAL DAILY
Status: DISCONTINUED | OUTPATIENT
Start: 2022-08-02 | End: 2022-08-03 | Stop reason: HOSPADM

## 2022-08-02 RX ADMIN — ATORVASTATIN CALCIUM 80 MG: 40 TABLET, FILM COATED ORAL at 20:46

## 2022-08-02 RX ADMIN — CLOPIDOGREL BISULFATE 75 MG: 75 TABLET ORAL at 15:25

## 2022-08-02 RX ADMIN — Medication 10 ML: at 08:37

## 2022-08-02 RX ADMIN — SULFUR HEXAFLUORIDE 2 ML: KIT at 14:30

## 2022-08-02 RX ADMIN — Medication 10 ML: at 20:46

## 2022-08-02 RX ADMIN — SODIUM CHLORIDE 75 ML/HR: 9 INJECTION, SOLUTION INTRAVENOUS at 01:53

## 2022-08-02 NOTE — PROGRESS NOTES
INTENSIVIST   PROGRESS NOTE     Hospital:  LOS: 1 day     Mr. Cholo Lion, 72 y.o. male is followed for a Chief Complaint of: CVA      Subjective   S     Interval History:  No acute events. Right lower extremity weakness improved this AM.        The patient's relevant past medical, surgical and social history were reviewed and updated in Epic as appropriate.      ROS:   Constitutional: Negative for fever.   Respiratory: Negative for dyspnea.   Cardiovascular: Negative for chest pain.   Gastrointestinal: Negative for  nausea, vomiting and diarrhea.     Objective   O     Vitals:  Temp  Min: 96 °F (35.6 °C)  Max: 97.7 °F (36.5 °C)  BP  Min: 82/56  Max: 143/72  Pulse  Min: 69  Max: 84  Resp  Min: 16  Max: 18  SpO2  Min: 92 %  Max: 99 % No data recorded    Intake/Ouptut 24 hrs (7:00AM - 6:59 AM)  Intake & Output (last 3 days)       07/30 0701  07/31 0700 07/31 0701  08/01 0700 08/01 0701  08/02 0700 08/02 0701  08/03 0700    P.O.   600     I.V. (mL/kg)   1106.1 (12.3)     Total Intake(mL/kg)   1706.1 (18.9)     Urine (mL/kg/hr)   2250 450 (0.7)    Total Output   2250 450    Net   -543.9 -450                  Medications (drips):  niCARdipine          Physical Examination  Telemetry:  Normal sinus rhythm.    Constitutional:  No acute distress.  Resting in bed.    Eyes: No scleral icterus.   PERRL, EOM intact.    Neck:  Supple, FROM   Cardiovascular: Normal rate, regular and rhythm. Normal heart sounds.  No murmurs, gallop or rub.   Respiratory: No respiratory distress. Normal respiratory effort.  Diminished bilaterally.  No wheezing.    Abdominal:  Soft. No masses. Nontender. No distension. No HSM.   Extremities: No digital cyanosis. No clubbing.  No peripheral edema.   Skin: No rashes, lesions or ulcers   Neurological:   Alert and Oriented to person, place, and time.       Interval:  (shift change)  1a. Level of Consciousness: 0-->Alert, keenly responsive  1b. LOC Questions: 0-->Answers both questions  correctly  1c. LOC Commands: 0-->Performs both tasks correctly  2. Best Gaze: 0-->Normal  3. Visual: 0-->No visual loss  4. Facial Palsy: 1-->Minor paralysis (flattened nasolabial fold, asymmetry on smiling)  5a. Motor Arm, Left: 0-->No drift, limb holds 90 (or 45) degrees for full 10 secs  5b. Motor Arm, Right: 0-->No drift, limb holds 90 (or 45) degrees for full 10 secs  6a. Motor Leg, Left: 0-->No drift, leg holds 30 degree position for full 5 secs  6b. Motor Leg, Right: 1-->Drift, leg falls by the end of the 5-sec period but does not hit bed  7. Limb Ataxia: 0-->Absent  8. Sensory: 1-->Mild-to-moderate sensory loss, patient feels pinprick is less sharp or is dull on the affected side, or there is a loss of superficial pain with pinprick, but patient is aware of being touched  9. Best Language: 0-->No aphasia, normal  10. Dysarthria: 1-->Mild-to-moderate dysarthria, patient slurs at least some words and, at worst, can be understood with some difficulty  11. Extinction and Inattention (formerly Neglect): 0-->No abnormality    Total (NIH Stroke Scale): 4      Results from last 7 days   Lab Units 08/02/22  0328 08/01/22  1104 08/01/22  1058   WBC 10*3/mm3 11.86* 10.22  --    HEMOGLOBIN g/dL 13.3 14.2  --    HEMOGLOBIN, POC g/dL  --   --  14.6   MCV fL 92.5 92.4  --    PLATELETS 10*3/mm3 203 232  --      Results from last 7 days   Lab Units 08/02/22  0328 08/01/22  1058   SODIUM mmol/L 146*  --    POTASSIUM mmol/L 4.5  --    CO2 mmol/L 26.0  --    CREATININE mg/dL 1.14 1.00   GLUCOSE mg/dL 110*  --    MAGNESIUM mg/dL 2.0  --    PHOSPHORUS mg/dL 3.5  --      Estimated Creatinine Clearance: 66.2 mL/min (by C-G formula based on SCr of 1.14 mg/dL).  Results from last 7 days   Lab Units 08/01/22  1149   ALT (SGPT) U/L 8   AST (SGOT) U/L 11             Images:  Imaging Results (Last 24 Hours)     Procedure Component Value Units Date/Time    MRI Brain Without Contrast [[id number]] Collected: 08/02/22 1221     Updated:  08/02/22 1229    Narrative:      DATE OF EXAM: 8/2/2022 11:58 AM     PROCEDURE: MRI BRAIN WO CONTRAST-     INDICATIONS: Stroke, follow up; I63.9-Cerebral infarction, unspecified;  R53.1-Weakness     COMPARISON: CT 1 day prior     TECHNIQUE: Multiplanar multisequence images of the brain were performed  without contrast according to routine brain MRI protocol.      FINDINGS:   A tiny focus of partially restricted diffusion is present along the  cortical margin of the left postcentral gyrus near the vertex. Midline  structures are unremarkable and the craniocervical junction is  satisfactory in appearance. Age-related changes are present with mild  generalized volume loss. Some focal cortical FLAIR hyperintensity is  present involving the left frontal lobe, with a tiny area of  corresponding susceptibility artifact also present. There is otherwise  no evidence of intracranial hemorrhage, mass or mass effect. The  ventricles are normal in size and configuration. The orbits are  unremarkable and the paranasal sinuses are grossly clear. Intracranial  arterial flow voids are maintained.       Impression:      Tiny focal diffusion signal abnormality present along the cortical  margin of the left postcentral gyrus near the vertex, probable small  acute infarct. There is additional FLAIR signal abnormality present  involving a small area of cortex within the left frontal lobe. The  second finding may reflect some reperfusion changes within an area of  transient ischemia, given recent administration of thrombolytics.     There is otherwise mild age-related change as above. No additional acute  ischemia, hemorrhage, mass or mass effect.     This report was finalized on 8/2/2022 12:26 PM by Prabhjot Sauceda.             Results: Reviewed.  I reviewed the patient's new laboratory and imaging results.  I independently reviewed the patient's new images.    Medications: Reviewed.    Assessment & Plan   A / P     Mr. Lion is a  71yo M with a history of CAD s/p CABG, HTN, HLD, COPD, Tobacco use, Afib (no anticoagulation), Ischemic cardiomyopathy s/p ICD placement and chronic systolic and diastolic heart failure (EF 20% and Grade III diastolic dysfunction on echo from 2016) who presented to the [facility] ED with acute onset of right upper extremity weakness and inability to use his right leg. CT head was performed and showed no acute intracranial abnormality. He was given TNK. CT perfusion and CTA of the head and neck were unremarkable.      Nutrition:   Diet Regular; Cardiac  Advance Directives:   There are no questions and answers to display.       Active Hospital Problems    Diagnosis    • **Acute CVA (cerebrovascular accident) ([facility])    • Ischemic cardiomyopathy      · Echocardiogram (08/10/2016): LVEF 20%. Grade III diastolic dysfunction.     • COPD (chronic obstructive pulmonary disease) ([facility])    • Tobacco abuse    • Essential hypertension    • Coronary artery disease involving native coronary artery of native heart without angina pectoris      · Cardiac catheterization for NSTEMI (08/2016). Patent stents in LM and Cx. Patent LIMA.   RCA with collaterals  · S/P CABG Data deficit.         Assessment / Plan:    1. MRI negative for hemorrhage after TNK.   2. ASA and Plavix  3. Home medications have been reviewed.   4. Follow up echocardiogram  5. PT evaluation  6. AM labs.   7. May be able to discharge home later this evening if Neurologic workup is complete.     High level of risk due to:  severe exacerbation of chronic illness and illness with threat to life or bodily function.    Plan of care and goals reviewed during interdisciplinary rounds.  I discussed the patient's findings and my recommendations with patient and nursing staff      Kate Garcia,     Intensive Care Medicine and Pulmonary Medicine      Name band;

## 2022-08-02 NOTE — PLAN OF CARE
Goal Outcome Evaluation:      Patient alert and oriented, lung sounds diminished with nonproductive cough, AV paced, bowel sounds normoactive x4 quadrants, voiding per uninal, no bowel movement this shift. NIH 6, 24 hour CT at 11am. MRI awaiting clearance from Cardiology.

## 2022-08-02 NOTE — PLAN OF CARE
Goal Outcome Evaluation:  Plan of Care Reviewed With: patient        Progress: no change  Outcome Evaluation: OT evaluation completed with pt. demonstrating decreased strength, balance, safety and endurance impacting high PLOF ADL's warranting continued skilled OT services.  Pt. min A supine to sit, sit to stand.  Pt. CGA to montse and doff socks. Pt. alert and oriented x 3.  If pt. progresses well will likely be able to discharge home with neice.

## 2022-08-02 NOTE — PLAN OF CARE
Goal Outcome Evaluation:  Plan of Care Reviewed With: patient        Progress: no change  Outcome Evaluation: PT initial eval compeleted. Pt limited by decreased functional endurance, mild RLE weakness, and mild balance deficit. Pt amb 150ft with CGA, 3 mild LOB, but pt improved with continued mobility. Rec continued skilled PT. d/c rec for home with assist and OP PT.

## 2022-08-02 NOTE — SIGNIFICANT NOTE
Patient having MRI at 24 hour post-TNK.  No need to perform 24 hour post-TNK CT head without contrast.    Claudia Talavera MSN, APRN, [facility]  Stroke Neurology

## 2022-08-02 NOTE — PROGRESS NOTES
"Neurology       Patient Care Team:  Mayra Brewer APRN as PCP - General (Family Medicine)  Mayra Brewer APRN as Referring Physician (Family Medicine)    Chief complaint leg weakness      Subjective .     History:    Patient seen this am. Tells me he feels better. Has been up walking with PT/OT who recommend home w/ assist    ROS: tired of being in the bed/ivs are painful     Objective     Vital Signs   Blood pressure 121/69, pulse 81, temperature 96 °F (35.6 °C), temperature source Axillary, resp. rate 18, height 177.8 cm (70\"), weight 90.2 kg (198 lb 13.7 oz), SpO2 96 %.    Physical Exam:  Thin elderly man, awake and alert.appears a bit frustrated. Attends to me and follows commands. Oriented to person, place and time. Speech is clear no aphasia or dysarthria. Eomi, no vf deficit, no facial weakness. Sensation intact light touch face/arm/leg bilaterally. Nml tone and bulk, no tremor, no drift of extremities. Mild weak in the right leg at the hip compared to left leg at full strength.    Results Review:          a1c 5.5  ldl 46           MRI brain personally reviewed/ agree w/ Radiology, tiny stroke, left postcentral gyrus    Vessel imaging reviewed. ?stenosis left pamela. No cutoff noted. Plaque in bilateral carotids, worse on right        CT Angiogram Neck    Result Date: 8/1/2022  Grossly symmetric cerebral perfusion with mild delayed perfusion in the bilateral watershed regions including small region of Tmax >6 seconds (7 mm) in the left corona radiata, without evidence of core infarct.  Essentially normal CTA of the head.  Moderate atherosclerotic narrowing of the origin of the right ICA by approximately 75% per NASCET criteria.  Severe narrowing of the origin of the right external carotid artery.  This report was finalized on 8/1/2022 11:58 AM by Kev Wright MD.      MRI Brain Without Contrast    Result Date: 8/2/2022  Tiny focal diffusion signal abnormality present along the cortical " margin of the left postcentral gyrus near the vertex, probable small acute infarct. There is additional FLAIR signal abnormality present involving a small area of cortex within the left frontal lobe. The second finding may reflect some reperfusion changes within an area of transient ischemia, given recent administration of thrombolytics.  There is otherwise mild age-related change as above. No additional acute ischemia, hemorrhage, mass or mass effect.  This report was finalized on 8/2/2022 12:26 PM by Prabhjot Sauceda.      XR Chest 1 View    Result Date: 8/1/2022  1.  Some suggested chronic changes at the lung bases.  This report was finalized on 8/1/2022 11:47 AM by Rolando Solorzano MD.      CT Head Without Contrast Stroke Protocol    Result Date: 8/1/2022  1.  An acute intracranial abnormality is not appreciated.  . Study was reviewed and discussed with team navigator at 10:42 AM.  This report was finalized on 8/1/2022 10:51 AM by Rolando Solorzano MD.      CT Angiogram Head w AI Analysis of LVO    Result Date: 8/1/2022  Grossly symmetric cerebral perfusion with mild delayed perfusion in the bilateral watershed regions including small region of Tmax >6 seconds (7 mm) in the left corona radiata, without evidence of core infarct.  Essentially normal CTA of the head.  Moderate atherosclerotic narrowing of the origin of the right ICA by approximately 75% per NASCET criteria.  Severe narrowing of the origin of the right external carotid artery.  This report was finalized on 8/1/2022 11:58 AM by Kev Wright MD.      CT CEREBRAL PERFUSION WITH & WITHOUT CONTRAST    Result Date: 8/1/2022  Grossly symmetric cerebral perfusion with mild delayed perfusion in the bilateral watershed regions including small region of Tmax >6 seconds (7 mm) in the left corona radiata, without evidence of core infarct.  Essentially normal CTA of the head.  Moderate atherosclerotic narrowing of the origin of the right ICA by approximately 75% per  NASCET criteria.  Severe narrowing of the origin of the right external carotid artery.  This report was finalized on 8/1/2022 11:58 AM by Kev Wright MD.          Assessment & Plan     73 y/o man with history of hypertension, hyperlipidemia, smoker, coronary artery disease s/p CABG, pacemaker/defibrillator, presents with right sided weakness, s/p TNK.     Cardiology has interrogated his pacemaker - no atrial fibrillation detected.     MRI brain has reviewed punctate stroke, left frontal gyrus, suspect embolic in nature. Since no afib on pacemaker, suspect etiology is artery to artery. 2d echo is pending.     No hemorrhage detected. Will place back on asa/plavix and check P2Y12 in the am. High dose statin.       Plan  -asa 325  -plavix 75  -P2Y12 in am  -2d echo pending  -carotid duplex read pending   -smoking cessation    Time spent >35    I discussed the patients findings and my recommendations with patient, primary team   Breanna Morton MD  08/02/22  10:17 EDT

## 2022-08-02 NOTE — PROGRESS NOTES
Cholo Mckeon Ayad  1950  N240/1        Cardiology asked to clear patient for MRI with known BiV PPM/ICD - [employer], implanted by Dr. Benjamin December 30, 2016.     Device is MRI compatible and form filled out.  Device was interrogated - report is on chartlet.  There was NO AFIB on interrogation.   Carotid duplex reviewed.  We suspect related to aortic atheroemboli with recommended medical regimen of Plavix and high intensity statin.  We will review transthoracic echo that has been ordered.  Again, no current indication for anti coagulation.     Mayra Garcia PA-C working with Dr. Samir Nation.    Samir Nation MD

## 2022-08-02 NOTE — THERAPY EVALUATION
Patient Name: Cholo Lion  : 1950    MRN: [id number]                              Today's Date: 2022       Admit Date: 2022    Visit Dx:     ICD-10-CM ICD-9-CM   1. Acute CVA (cerebrovascular accident) (Formerly [facility])  I63.9 434.91   2. Acute right-sided weakness  R53.1 728.87     Patient Active Problem List   Diagnosis   • Coronary artery disease involving native coronary artery of native heart without angina pectoris   • Essential hypertension   • Hyperlipidemia LDL goal <70   • Tobacco abuse   • Precordial pain   • Lung nodules   • COPD (chronic obstructive pulmonary disease) ([facility])   • Ischemic cardiomyopathy   • RBBB   • PVC's (premature ventricular contractions)   • Cardiac resynchronization therapy defibrillator (CRT-D) in place   • Abnormal stress test   • Acute CVA (cerebrovascular accident) ([facility])     Past Medical History:   Diagnosis Date   • Bundle branch block, right    • Cancer ([facility])    • Cardiomyopathy ([facility])    • Chest pain    • CHF (congestive heart failure) ([facility])    • COPD (chronic obstructive pulmonary disease) ([facility]) 2016   • Coronary artery disease    • Heart attack ([facility])    • History of shingles    • Hyperlipidemia    • Hypertension    • Lung nodules 2016   • Tremors of nervous system      Past Surgical History:   Procedure Laterality Date   • BIVENTRICULLAR IMPLANTABLE CARDIOVERTER DEFIBRILLATOR PLACEMENT     • CARDIAC CATHETERIZATION N/A 8/10/2016    Procedure: Left Heart Cath;  Surgeon: Parbhjot Lala MD;  Location:  [facility];  Service:    • CARDIAC CATHETERIZATION N/A 2019    Procedure: Left Heart Cath;  Surgeon: Prabhjot Lala MD;  Location:  [facility];  Service: Cardiovascular   • CARDIAC ELECTROPHYSIOLOGY PROCEDURE N/A 2016    Procedure: Implant ICD - bi ventricular;  Surgeon: Espinoza Benjamin DO;  Location:  [facility];  Service:    • COLON SURGERY  2016   • CORONARY ARTERY  BYPASS GRAFT     • LIPOMA EXCISION      abdomen   • OTHER SURGICAL HISTORY      heart stents      General Information     Row Name 08/02/22 1122          Physical Therapy Time and Intention    Document Type evaluation  -AY     Mode of Treatment physical therapy  -AY     Row Name 08/02/22 1122          General Information    Patient Profile Reviewed yes  -AY     Prior Level of Function independent:;all household mobility;community mobility;transfer;gait;bed mobility;ADL's;using stairs  -AY     Existing Precautions/Restrictions fall  -AY     Barriers to Rehab none identified  -AY     Row Name 08/02/22 1122          Living Environment    People in Home --  niece  -AY     Row Name 08/02/22 1122          Home Main Entrance    Number of Stairs, Main Entrance other (see comments)  ramp  -AY     Row Name 08/02/22 1122          Stairs Within Home, Primary    Number of Stairs, Within Home, Primary none  -AY     Row Name 08/02/22 1122          Cognition    Orientation Status (Cognition) oriented x 4  -AY     Row Name 08/02/22 1122          Safety Issues, Functional Mobility    Safety Issues Affecting Function (Mobility) insight into deficits/self-awareness;sequencing abilities  -AY     Impairments Affecting Function (Mobility) balance;endurance/activity tolerance;strength  -AY           User Key  (r) = Recorded By, (t) = Taken By, (c) = Cosigned By    Initials Name Provider Type    AY Molly Meredith, PT Physical Therapist               Mobility     Row Name 08/02/22 1123          Bed Mobility    Bed Mobility sit-supine  -AY     Sit-Supine [place] (Bed Mobility) contact guard  -AY     Assistive Device (Bed Mobility) bed rails;draw sheet;head of bed elevated  -AY     Row Name 08/02/22 1123          Sit-Stand Transfer    Sit-Stand [place] (Transfers) contact guard;verbal cues  -AY     Row Name 08/02/22 1123          Gait/Stairs (Locomotion)    [place] Level (Gait) contact guard  -AY     Distance in Feet (Gait) 150   -AY     Deviations/Abnormal Patterns (Gait) carol decreased;gait speed decreased;bilateral deviations;stride length decreased;base of support, narrow  -AY     Bilateral Gait Deviations forward flexed posture;heel strike decreased  -AY     Comment, (Gait/Stairs) pt demonstrated step through gait pattern with decreased carol and flexed posture. Three mild LOB requiring min A to correct, all of which occured while pt attempting to converse during mobility. Gait distance limited by fatigue.  -AY           User Key  (r) = Recorded By, (t) = Taken By, (c) = Cosigned By    Initials Name Provider Type    AY Molly Meredith, MADELAINE Physical Therapist               Obj/Interventions     Row Name 08/02/22 1124          Range of Motion Comprehensive    General Range of Motion bilateral lower extremity ROM WFL  -AY     Row Name 08/02/22 1124          Strength Comprehensive (MMT)    General Manual Muscle Testing (MMT) Assessment lower extremity strength deficits identified  -AY     Comment, General Manual Muscle Testing (MMT) Assessment RLE grossly 4-/5. LLE grossly 5/5  -AY     Row Name 08/02/22 1124          Motor Skills    Motor Skills coordination  -AY     Coordination gross motor deficit;right;lower extremity;minimal impairment;heel to garcia  -AY     Row Name 08/02/22 1124          Balance    Balance Assessment sitting static balance;sitting dynamic balance;sit to stand dynamic balance;standing static balance;standing dynamic balance  -AY     Static Sitting Balance standby assist  -AY     Dynamic Sitting Balance contact guard  -AY     Position, Sitting Balance unsupported;sitting edge of bed  -AY     Sit to Stand Dynamic Balance contact guard  -AY     Static Standing Balance contact guard  -AY     Dynamic Standing Balance contact guard  -AY     Row Name 08/02/22 1124          Sensory Assessment (Somatosensory)    Sensory Assessment (Somatosensory) LE sensation intact  -AY           User Key  (r) = Recorded By, (t) = Taken By,  (c) = Cosigned By    Initials Name Provider Type    Molly Ragsdale, PT Physical Therapist               Goals/Plan     Row Name 08/02/22 1125          Bed Mobility Goal 1 (PT)    Activity/Assistive Device (Bed Mobility Goal 1, PT) sit to supine/supine to sit  -AY     [place] Level/Cues Needed (Bed Mobility Goal 1, PT) independent  -AY     Time Frame (Bed Mobility Goal 1, PT) long term goal (LTG);2 weeks  -AY     Progress/Outcomes (Bed Mobility Goal 1, PT) goal ongoing  -AY     [facility] Name 08/02/22 1125          Transfer Goal 1 (PT)    Activity/Assistive Device (Transfer Goal 1, PT) sit-to-stand/stand-to-sit  -AY     [place] Level/Cues Needed (Transfer Goal 1, PT) independent  -AY     Time Frame (Transfer Goal 1, PT) long term goal (LTG);2 weeks  -AY     Progress/Outcome (Transfer Goal 1, PT) goal ongoing  -AY     Row Name 08/02/22 1125          Gait Training Goal 1 (PT)    Activity/Assistive Device (Gait Training Goal 1, PT) gait (walking locomotion)  -AY     [place] Level (Gait Training Goal 1, PT) independent  -AY     Distance (Gait Training Goal 1, PT) 500  -AY     Time Frame (Gait Training Goal 1, PT) long term goal (LTG);2 weeks  -AY     Progress/Outcome (Gait Training Goal 1, PT) goal ongoing  -AY     Row Name 08/02/22 1125          Therapy Assessment/Plan (PT)    Planned Therapy Interventions (PT) balance training;bed mobility training;gait training;home exercise program;patient/family education;strengthening;transfer training;postural re-education  -AY           User Key  (r) = Recorded By, (t) = Taken By, (c) = Cosigned By    Initials Name Provider Type    Molly Ragsdale, PT Physical Therapist               Clinical Impression     Row Name 08/02/22 1127          Pain    Pretreatment Pain Rating 0/10 - no pain  -AY     Posttreatment Pain Rating 0/10 - no pain  -AY     Additional Documentation Pain Scale: Numbers Pre/Post-Treatment (Group)  -AY     Row Name 08/02/22 1127          Plan of Care  Review    Plan of Care Reviewed With patient  -AY     Progress no change  -AY     Outcome Evaluation PT initial eval compeleted. Pt limited by decreased functional endurance, mild RLE weakness, and mild balance deficit. Pt amb 150ft with CGA, 3 mild LOB, but pt improved with continued mobility. Rec continued skilled PT. d/c rec for home with assist and OP PT.  -AY     Row Name 08/02/22 1127          Therapy Assessment/Plan (PT)    Rehab Potential (PT) good, to achieve stated therapy goals  -AY     Criteria for Skilled Interventions Met (PT) yes;meets criteria;skilled treatment is necessary  -AY     Therapy Frequency (PT) daily  -AY     Row Name 08/02/22 1127          Vital Signs    Pre Systolic BP Rehab 130  -AY     Pre Treatment Diastolic BP 77  -AY     Post Systolic BP Rehab 148  -AY     Post Treatment Diastolic BP 75  -AY     Pretreatment Heart Rate (beats/min) 73  -AY     Posttreatment Heart Rate (beats/min) 70  -AY     Pre SpO2 (%) 96  -AY     O2 Delivery Pre Treatment room air  -AY     O2 Delivery Intra Treatment room air  -AY     Post SpO2 (%) 97  -AY     O2 Delivery Post Treatment room air  -AY     Pre Patient Position Sitting  -AY     Intra Patient Position Standing  -AY     Post Patient Position Supine  -AY     Row Name 08/02/22 1127          Positioning and Restraints    Pre-Treatment Position in bed  -AY     Post Treatment Position bed  -AY     In Bed notified nsg;supine;call light within reach;encouraged to call for assist;exit alarm on;patient within staff view;side rails up x2;legs elevated  -AY           User Key  (r) = Recorded By, (t) = Taken By, (c) = Cosigned By    Initials Name Provider Type    AY Molly Meredith, PT Physical Therapist               Outcome Measures     Row Name 08/02/22 1126          How much help from another person do you currently need...    Turning from your back to your side while in flat bed without using bedrails? 3  -AY     Moving from lying on back to sitting on the side  of a flat bed without bedrails? 3  -AY     Moving to and from a bed to a chair (including a wheelchair)? 3  -AY     Standing up from a chair using your arms (e.g., wheelchair, bedside chair)? 3  -AY     Climbing 3-5 steps with a railing? 3  -AY     To walk in hospital room? 3  -AY     AM-PAC 6 Clicks Score (PT) 18  -AY     Highest level of mobility 6 --> Walked 10 steps or more  -AY     Row Name 08/02/22 1126          Modified [place] Scale    Pre-Stroke Modified [place] Scale 6 - Unable to determine (UTD) from the medical record documentation  -AY     Modified [place] Scale 2 - Slight disability.  Unable to carry out all previous activities but able to look after own affairs without assistance.  -AY     Row Name 08/02/22 1126          Functional Assessment    Outcome Measure Options AM-PAC 6 Clicks Basic Mobility (PT);Modified [place]  -AY           User Key  (r) = Recorded By, (t) = Taken By, (c) = Cosigned By    Initials Name Provider Type    AY Molly Meredith PT Physical Therapist                             Physical Therapy Education                 Title: PT OT SLP Therapies (In Progress)     Topic: Physical Therapy (In Progress)     Point: Mobility training (Done)     Learning Progress Summary           Patient Acceptance, E,TB, VU,NR by AY at 8/2/2022 1126                   Point: Home exercise program (Not Started)     Learner Progress:  Not documented in this visit.          Point: Body mechanics (Done)     Learning Progress Summary           Patient Acceptance, E,TB, VU,NR by AY at 8/2/2022 1126                   Point: Precautions (Done)     Learning Progress Summary           Patient Acceptance, E,TB, VU,NR by AY at 8/2/2022 1126                               User Key     Initials Effective Dates Name Provider Type Discipline    AY 11/10/20 -  Molly Meredith PT Physical Therapist PT              PT Recommendation and Plan  Planned Therapy Interventions (PT): balance training, bed mobility training, gait  training, home exercise program, patient/family education, strengthening, transfer training, postural re-education  Plan of Care Reviewed With: patient  Progress: no change  Outcome Evaluation: PT initial eval compeleted. Pt limited by decreased functional endurance, mild RLE weakness, and mild balance deficit. Pt amb 150ft with CGA, 3 mild LOB, but pt improved with continued mobility. Rec continued skilled PT. d/c rec for home with assist and OP PT.     Time Calculation:    PT Charges     Row Name 08/02/22 1130             Time Calculation    Start Time 1120  -AY      PT Received On 08/02/22  -AY      PT Goal Re-Cert Due Date 08/12/22  -AY              Untimed Charges    PT Eval/Re-eval Minutes 46  -AY              Total Minutes    Untimed Charges Total Minutes 46  -AY       Total Minutes 46  -AY            User Key  (r) = Recorded By, (t) = Taken By, (c) = Cosigned By    Initials Name Provider Type    AY Molly Meredith, PT Physical Therapist              Therapy Charges for Today     Code Description Service Date Service Provider Modifiers Qty    12273590124 HC PT EVAL LOW COMPLEXITY 4 8/2/2022 Molly Meredith, PT GP 1          PT G-Codes  Outcome Measure Options: AM-PAC 6 Clicks Basic Mobility (PT), Modified Harris  AM-PAC 6 Clicks Score (PT): 18  Modified [place] Scale: 2 - Slight disability.  Unable to carry out all previous activities but able to look after own affairs without assistance.    Molly Meredith PT  8/2/2022

## 2022-08-02 NOTE — THERAPY EVALUATION
Patient Name: Cholo Lion  : 1950    MRN: [id number]                              Today's Date: 2022       Admit Date: 2022    Visit Dx:     ICD-10-CM ICD-9-CM   1. Acute CVA (cerebrovascular accident) ([facility])  I63.9 434.91   2. Acute right-sided weakness  R53.1 728.87     Patient Active Problem List   Diagnosis   • Coronary artery disease involving native coronary artery of native heart without angina pectoris   • Essential hypertension   • Hyperlipidemia LDL goal <70   • Tobacco abuse   • Precordial pain   • Lung nodules   • COPD (chronic obstructive pulmonary disease) ([facility])   • Ischemic cardiomyopathy   • RBBB   • PVC's (premature ventricular contractions)   • Cardiac resynchronization therapy defibrillator (CRT-D) in place   • Abnormal stress test   • Acute CVA (cerebrovascular accident) ([facility])     Past Medical History:   Diagnosis Date   • Bundle branch block, right    • Cancer ([facility])    • Cardiomyopathy ([facility])    • Chest pain    • CHF (congestive heart failure) ([facility])    • COPD (chronic obstructive pulmonary disease) ([facility]) 2016   • Coronary artery disease    • Heart attack ([facility])    • History of shingles    • Hyperlipidemia    • Hypertension    • Lung nodules 2016   • Tremors of nervous system      Past Surgical History:   Procedure Laterality Date   • BIVENTRICULLAR IMPLANTABLE CARDIOVERTER DEFIBRILLATOR PLACEMENT     • CARDIAC CATHETERIZATION N/A 8/10/2016    Procedure: Left Heart Cath;  Surgeon: Prabhjot Lala MD;  Location:  [facility];  Service:    • CARDIAC CATHETERIZATION N/A 2019    Procedure: Left Heart Cath;  Surgeon: Prabhjot Lala MD;  Location:  [facility];  Service: Cardiovascular   • CARDIAC ELECTROPHYSIOLOGY PROCEDURE N/A 2016    Procedure: Implant ICD - bi ventricular;  Surgeon: Espinoza Benjamin DO;  Location:  [facility];  Service:    • COLON SURGERY  2016   • CORONARY ARTERY  BYPASS GRAFT     • LIPOMA EXCISION      abdomen   • OTHER SURGICAL HISTORY      heart stents      General Information     Row Name 08/02/22 [facility]6          OT Time and Intention    Document Type evaluation  -ANAHI     Mode of Treatment occupational therapy  -ANAHI     Row Name 08/02/22 1116          General Information    Patient Profile Reviewed yes  -ANAHI     Prior Level of Function independent:;all household mobility;community mobility;gait;transfer;bed mobility;feeding;grooming;dressing;bathing;shopping;driving;yard work;max assist:;home management;cooking;cleaning  per pt. sg does most all of the inside work and he takes care of the yard  -ANAHI     Existing Precautions/Restrictions fall  -ANAHI     Barriers to Rehab none identified  -ANAHI     Row Name 08/02/22 1116          Occupational Profile    Environmental Supports and Barriers (Occupational Profile) wx in shower, grab bar pt. can use to pull off toilet or step into wx in shower, but not use once in for showering, pt. with wx, can and w/c in which he does not use, ramp in  -ANAHI     Row Name 08/02/22 1116          Living Environment    People in Home other (see comments)  lives with sg, who does not work  -ANAHI     Row Name 08/02/22 [facility]6          Home Main Entrance    Number of Stairs, Main Entrance none;other (see comments)  ramp  -ANAHI     Row Name 08/02/22 1116          Stairs Within Home, Primary    Number of Stairs, Within Home, Primary none  -ANAHI     Row Name 08/02/22 1116          Cognition    Orientation Status (Cognition) oriented x 4  -ANAHI     Row Name 08/02/22 1116          Safety Issues, Functional Mobility    Safety Issues Affecting Function (Mobility) safety precaution awareness  -ANAHI     Impairments Affecting Function (Mobility) balance;endurance/activity tolerance;strength  -ANAHI           User Key  (r) = Recorded By, (t) = Taken By, (c) = Cosigned By    Initials Name Provider Type    Shayla Leon, OT Occupational Therapist                 Mobility/ADL's      [facility] Name 08/02/22 [facility]9          Bed Mobility    Bed Mobility supine-sit;scooting/bridging  -     Scooting/Bridging [place] (Bed Mobility) moderate assist (50% patient effort);verbal cues  -     Supine-Sit [place] (Bed Mobility) minimum assist (75% patient effort);verbal cues  -     Bed Mobility, Safety Issues impaired trunk control for bed mobility;decreased use of legs for bridging/pushing;decreased use of arms for pushing/pulling  -     Assistive Device (Bed Mobility) bed rails;draw sheet;head of bed elevated  -     Comment, (Bed Mobility) Pt. needed min A with trunk and mod for R hip to EOB.  -ANAHI     Row Name 08/02/22 1119          Transfers    Transfers sit-stand transfer;stand-sit transfer  -     Sit-Stand [place] (Transfers) contact guard;verbal cues  -     Stand-Sit [place] (Transfers) contact guard  -ANAHI     Row Name 08/02/22 [facility]9          Sit-Stand Transfer    Assistive Device (Sit-Stand Transfers) other (see comments)  -     Comment, (Sit-Stand Transfer) UE support  -ANAHI     Row Name 08/02/22 [facility]9          Functional Mobility    Functional Mobility- Ind. Level minimum assist (75% patient effort);1 person + 1 person to manage equipment  -     Functional Mobility-Distance (Feet) --  household distance +  -     Functional Mobility- Safety Issues step length decreased  -     Functional Mobility- Comment initial min A with RUE support progressing to CGA  -[facility] Name 08/02/22 [facility]9          Activities of Daily Living    BADL Assessment/Intervention lower body dressing;upper body dressing  -ANAHI     Row Name 08/02/22 [facility]9          Lower Body Dressing Assessment/Training    [place] Level (Lower Body Dressing) doff;don;socks;contact guard assist  -     Position (Lower Body Dressing) edge of bed sitting  -[facility] Name 08/02/22 1119          Upper Body Dressing Assessment/Training    [place] Level (Upper Body Dressing) doff;don;pajama/robe  -     Position  (Upper Body Dressing) edge of bed sitting  -ANAHI     Comment, (Upper Body Dressing) gown soiled and changed out, max A, limited by IV's in both elbow bends with pain when bends.  -           User Key  (r) = Recorded By, (t) = Taken By, (c) = Cosigned By    Initials Name Provider Type    Shayla Leon, OT Occupational Therapist               Obj/Interventions     Row Name 08/02/22 1122          Sensory Assessment (Somatosensory)    Sensory Assessment (Somatosensory) UE sensation intact  -[facility] Name 08/02/22 1122          Vision Assessment/Intervention    Visual Impairment/Limitations WFL  -     Vision Assessment Comment pt. able to track, read signs in hallway, room and read a paragraph accurately, pt. able to ID stimuli L and R  -[facility] Name 08/02/22 1122          Range of Motion Comprehensive    General Range of Motion bilateral upper extremity ROM WFL  -     Comment, General Range of Motion unable to test full elbow flexion due to IV placement  -     Row Name 08/02/22 1122          Strength Comprehensive (MMT)    General Manual Muscle Testing (MMT) Assessment no strength deficits identified  -ANAHI     Comment, General Manual Muscle Testing (MMT) Assessment BUE 5/5  -     Row Name 08/02/22 1122          Motor Skills    Motor Skills coordination  -     Coordination WFL;dysdiadochokinesia  -[facility] Name 08/02/22 1122          Balance    Balance Assessment sitting static balance;sitting dynamic balance;standing static balance;standing dynamic balance  -ANAHI     Static Sitting Balance standby assist  -ANAHI     Dynamic Sitting Balance contact guard  LBD  -ANAHI     Position, Sitting Balance sitting edge of bed;unsupported  -ANAHI     Static Standing Balance contact guard  -ANAHI     Dynamic Standing Balance minimal assist  pt. able to progress to CGA  -ANAHI     Balance Interventions sit to stand;occupation based/functional task  -           User Key  (r) = Recorded By, (t) = Taken By, (c) = Cosigned By     Initials Name Provider Type    Shayla Leon, OT Occupational Therapist               Goals/Plan     Row Name 08/02/22 1128          Bed Mobility Goal 1 (OT)    Activity/Assistive Device (Bed Mobility Goal 1, OT) bed mobility activities, all  -ANAHI     [place] Level/Cues Needed (Bed Mobility Goal 1, OT) independent  -ANAHI     Time Frame (Bed Mobility Goal 1, OT) long term goal (LTG);10 days  -ANAHI     Progress/Outcomes (Bed Mobility Goal 1, OT) new goal  -ANAHI     Row Name 08/02/22 1128          Transfer Goal 1 (OT)    Activity/Assistive Device (Transfer Goal 1, OT) toilet;commode;other (see comments)  grab bar  -ANAHI     [place] Level/Cues Needed (Transfer Goal 1, OT) standby assist  -ANAHI     Time Frame (Transfer Goal 1, OT) long term goal (LTG);10 days  -ANAHI     Progress/Outcome (Transfer Goal 1, OT) new goal  -ANAHI     Row Name 08/02/22 1128          Dressing Goal 1 (OT)    Activity/Device (Dressing Goal 1, OT) lower body dressing  pants and /or undergarment  -ANAHI     [place]/Cues Needed (Dressing Goal 1, OT) standby assist  -ANAHI     Time Frame (Dressing Goal 1, OT) long term goal (LTG);10 days  -ANAHI     Progress/Outcome (Dressing Goal 1, OT) new goal  -ANAHI     Row Name 08/02/22 1128          Grooming Goal 1 (OT)    Activity/Device (Grooming Goal 1, OT) hair care;oral care;wash face, hands  -ANAHI     [place] (Grooming Goal 1, OT) supervision required  -ANAHI     Time Frame (Grooming Goal 1, OT) long term goal (LTG);10 days  -ANAHI     Strategies/Barriers (Grooming Goal 1, OT) standing sink side  -ANAHI     Progress/Outcome (Grooming Goal 1, OT) new goal  -ANAHI     Row Name 08/02/22 1128          Problem Specific Goal 1 (OT)    Problem Specific Goal 1 (OT) Pt. will be able to reach and place items high and low SBA to support IADL independence.  -ANAHI     Time Frame (Problem Specific Goal 1, OT) long term goal (LTG);10 days  -ANAHI     Progress/Outcome (Problem Specific Goal 1, OT) new goal  -ANAHI     Row Name 08/02/22 1128           Therapy Assessment/Plan (OT)    Planned Therapy Interventions (OT) activity tolerance training;BADL retraining;functional balance retraining;occupation/activity based interventions;patient/caregiver education/training;transfer/mobility retraining  -           User Key  (r) = Recorded By, (t) = Taken By, (c) = Cosigned By    Initials Name Provider Type    Shayla Leon, OT Occupational Therapist               Clinical Impression     Row Name 08/02/22 1124          Pain Assessment    Pretreatment Pain Rating 0/10 - no pain  -     Posttreatment Pain Rating 0/10 - no pain  -ANAHI     Row Name 08/02/22 1124          Plan of Care Review    Plan of Care Reviewed With patient  -ANAHI     Progress no change  -     Outcome Evaluation OT evaluation completed with pt. demonstrating decreased strength, balance, safety and endurance impacting high PLOF ADL's warranting continued skilled OT services.  Pt. min A supine to sit, sit to stand.  Pt. CGA to montse and doff socks. Pt. alert and oriented x 3.  If pt. progresses well will likely be able to discharge home with neice.  -ANAHI     Row Name 08/02/22 1124          Therapy Assessment/Plan (OT)    Patient/Family Therapy Goal Statement (OT) return to PLOF  -     Rehab Potential (OT) good, to achieve stated therapy goals  -     Criteria for Skilled Therapeutic Interventions Met (OT) yes;meets criteria;skilled treatment is necessary  -     Therapy Frequency (OT) daily  -[facility] Name 08/02/22 1124          Therapy Plan Review/Discharge Plan (OT)    Anticipated Discharge Disposition (OT) home with assist  -ANAHI     Row Name 08/02/22 1124          Vital Signs    Pre Systolic BP Rehab 130  -ANAHI     Pre Treatment Diastolic BP 77  -ANAHI     Post Systolic BP Rehab 148  -ANAHI     Post Treatment Diastolic BP 75  -ANAHI     Pretreatment Heart Rate (beats/min) 75  -ANAHI     Posttreatment Heart Rate (beats/min) 73  -ANAHI     O2 Delivery Pre Treatment room air  -ANAHI     O2 Delivery Intra  Treatment room air  -ANAHI     Post SpO2 (%) 96  -ANAHI     O2 Delivery Post Treatment room air  -ANAHI     Pre Patient Position Supine  -ANAHI     Intra Patient Position Standing  -ANAHI     Post Patient Position Supine  -ANAHI     Row Name 08/02/22 1124          Positioning and Restraints    Pre-Treatment Position in bed  -ANAHI     Post Treatment Position bed  plans for 2 test, returned to bed  -ANAHI     In Bed supine;call light within reach;encouraged to call for assist;exit alarm on  -ANAHI           User Key  (r) = Recorded By, (t) = Taken By, (c) = Cosigned By    Initials Name Provider Type    Shayla Leon, OT Occupational Therapist               Outcome Measures     Row Name 08/02/22 1130          How much help from another is currently needed...    Putting on and taking off regular lower body clothing? 3  -ANAHI     Bathing (including washing, rinsing, and drying) 3  -ANAHI     Toileting (which includes using toilet bed pan or urinal) 3  -ANAHI     Putting on and taking off regular upper body clothing 3  -ANAHI     Taking care of personal grooming (such as brushing teeth) 3  -ANAHI     Eating meals 4  -ANAHI     AM-PAC 6 Clicks Score (OT) 19  -ANAHI     Row Name 08/02/22 1126          How much help from another person do you currently need...    Turning from your back to your side while in flat bed without using bedrails? 3  -AY     Moving from lying on back to sitting on the side of a flat bed without bedrails? 3  -AY     Moving to and from a bed to a chair (including a wheelchair)? 3  -AY     Standing up from a chair using your arms (e.g., wheelchair, bedside chair)? 3  -AY     Climbing 3-5 steps with a railing? 3  -AY     To walk in hospital room? 3  -AY     AM-PAC 6 Clicks Score (PT) 18  -AY     Highest level of mobility 6 --> Walked 10 steps or more  -AY     Row Name 08/02/22 1130 08/02/22 1126       Modified [place] Scale    Pre-Stroke Modified [place] Scale 0 - No Symptoms at all.  -ANAHI 6 - Unable to determine (UTD) from the medical record  documentation  -AY    Modified [place] Scale 2 - Slight disability.  Unable to carry out all previous activities but able to look after own affairs without assistance.  -ANAHI 2 - Slight disability.  Unable to carry out all previous activities but able to look after own affairs without assistance.  -AY    Row Name 08/02/22 1130 08/02/22 1126       Functional Assessment    Outcome Measure Options AM-PAC 6 Clicks Daily Activity (OT);Modified Bamberg  -ANAHI AM-PAC 6 Clicks Basic Mobility (PT);Modified [place]  -AY          User Key  (r) = Recorded By, (t) = Taken By, (c) = Cosigned By    Initials Name Provider Type    Shayla Leon, OT Occupational Therapist    Molly Ragsdale, PT Physical Therapist                Occupational Therapy Education                 Title: PT OT SLP Therapies (In Progress)     Topic: Occupational Therapy (In Progress)     Point: ADL training (Done)     Description:   Instruct learner(s) on proper safety adaptation and remediation techniques during self care or transfers.   Instruct in proper use of assistive devices.              Learning Progress Summary           Patient Acceptance, E, VU,NR by ANAHI at 8/2/2022 1131    Comment: need for gait belt use, need for assist when up, reason for consult                   Point: Home exercise program (Not Started)     Description:   Instruct learner(s) on appropriate technique for monitoring, assisting and/or progressing therapeutic exercises/activities.              Learner Progress:  Not documented in this visit.          Point: Precautions (Done)     Description:   Instruct learner(s) on prescribed precautions during self-care and functional transfers.              Learning Progress Summary           Patient Acceptance, E, VU,NR by ANAHI at 8/2/2022 1131    Comment: need for gait belt use, need for assist when up, reason for consult                   Point: Body mechanics (Not Started)     Description:   Instruct learner(s) on proper positioning and  spine alignment during self-care, functional mobility activities and/or exercises.              Learner Progress:  Not documented in this visit.                      User Key     Initials Effective Dates Name Provider Type Discipline    ANAHI 06/16/21 -  Shayla Granger OT Occupational Therapist OT              OT Recommendation and Plan  Planned Therapy Interventions (OT): activity tolerance training, BADL retraining, functional balance retraining, occupation/activity based interventions, patient/caregiver education/training, transfer/mobility retraining  Therapy Frequency (OT): daily  Plan of Care Review  Plan of Care Reviewed With: patient  Progress: no change  Outcome Evaluation: OT evaluation completed with pt. demonstrating decreased strength, balance, safety and endurance impacting high PLOF ADL's warranting continued skilled OT services.  Pt. min A supine to sit, sit to stand.  Pt. CGA to montse and doff socks. Pt. alert and oriented x 3.  If pt. progresses well will likely be able to discharge home with neice.     Time Calculation:    Time Calculation- OT     Row Name 08/02/22 1132             Time Calculation- OT    OT Start Time 1055  -ANAHI      OT Received On 08/02/22  -ANAHI      OT Goal Re-Cert Due Date 08/12/22  -ANAHI              Untimed Charges    OT Eval/Re-eval Minutes 42  -ANAHI              Total Minutes    Untimed Charges Total Minutes 42  -ANAHI       Total Minutes 42  -ANAHI            User Key  (r) = Recorded By, (t) = Taken By, (c) = Cosigned By    Initials Name Provider Type    Shayla Leon OT Occupational Therapist              Therapy Charges for Today     Code Description Service Date Service Provider Modifiers Qty    [id number] HC OT EVAL MOD COMPLEXITY 3 8/2/2022 Shayla Granger OT GO 1               Shayla Granger OT  8/2/2022

## 2022-08-02 NOTE — NURSING NOTE
NIHSS 4. Patient alert and oriented. RLE weak but improving. Baseline tremors. No complaints of pain. Echo and duplex complete-results pending. Possible discharge home tomorrow. Daughter updated at bedside.

## 2022-08-02 NOTE — CONSULTS
Chart review for diabetes educator consult.    At the time of this review patient A1c is 5.6, they have no noted history of diabetes and no home medications noted for treatment of diabetes. At this time we do not feel the patient would benefit from diabetes education. Thank you for this consult, should patient needs change please re consult us.

## 2022-08-03 ENCOUNTER — READMISSION MANAGEMENT (OUTPATIENT)
Dept: CALL CENTER | Facility: HOSPITAL | Age: 72
End: 2022-08-03

## 2022-08-03 VITALS
HEART RATE: 69 BPM | BODY MASS INDEX: 27.11 KG/M2 | RESPIRATION RATE: 18 BRPM | WEIGHT: 189.38 LBS | HEIGHT: 70 IN | TEMPERATURE: 98.3 F | DIASTOLIC BLOOD PRESSURE: 62 MMHG | OXYGEN SATURATION: 94 % | SYSTOLIC BLOOD PRESSURE: 117 MMHG

## 2022-08-03 LAB
ANION GAP SERPL CALCULATED.3IONS-SCNC: 8 MMOL/L (ref 5–15)
BH CV ECHO MEAS - AO MAX PG: 6 MMHG
BH CV ECHO MEAS - AO MEAN PG: 3.2 MMHG
BH CV ECHO MEAS - AO ROOT DIAM: 3.5 CM
BH CV ECHO MEAS - AO V2 MAX: 122.2 CM/SEC
BH CV ECHO MEAS - AO V2 VTI: 25.9 CM
BH CV ECHO MEAS - AVA(I,D): 2.46 CM2
BH CV ECHO MEAS - EDV(CUBED): 230.5 ML
BH CV ECHO MEAS - EDV(MOD-SP2): 210 ML
BH CV ECHO MEAS - EDV(MOD-SP4): 222 ML
BH CV ECHO MEAS - EF(MOD-BP): 22.2 %
BH CV ECHO MEAS - EF(MOD-SP2): 24.3 %
BH CV ECHO MEAS - EF(MOD-SP4): 25.2 %
BH CV ECHO MEAS - ESV(CUBED): 89.5 ML
BH CV ECHO MEAS - ESV(MOD-SP2): 159 ML
BH CV ECHO MEAS - ESV(MOD-SP4): 166 ML
BH CV ECHO MEAS - FS: 27 %
BH CV ECHO MEAS - IVS/LVPW: 0.95 CM
BH CV ECHO MEAS - IVSD: 0.77 CM
BH CV ECHO MEAS - LA DIMENSION: 3.7 CM
BH CV ECHO MEAS - LAT PEAK E' VEL: 7.5 CM/SEC
BH CV ECHO MEAS - LV MASS(C)D: 190.1 GRAMS
BH CV ECHO MEAS - LV MAX PG: 4.5 MMHG
BH CV ECHO MEAS - LV MEAN PG: 1.91 MMHG
BH CV ECHO MEAS - LV V1 MAX: 105.9 CM/SEC
BH CV ECHO MEAS - LV V1 VTI: 20.2 CM
BH CV ECHO MEAS - LVIDD: 6.1 CM
BH CV ECHO MEAS - LVIDS: 4.5 CM
BH CV ECHO MEAS - LVOT AREA: 3.2 CM2
BH CV ECHO MEAS - LVOT DIAM: 2 CM
BH CV ECHO MEAS - LVPWD: 0.81 CM
BH CV ECHO MEAS - MED PEAK E' VEL: 5.9 CM/SEC
BH CV ECHO MEAS - MV A MAX VEL: 78.4 CM/SEC
BH CV ECHO MEAS - MV DEC SLOPE: 251.8 CM/SEC2
BH CV ECHO MEAS - MV DEC TIME: 0.27 MSEC
BH CV ECHO MEAS - MV E MAX VEL: 46.3 CM/SEC
BH CV ECHO MEAS - MV E/A: 0.59
BH CV ECHO MEAS - MV MAX PG: 3.2 MMHG
BH CV ECHO MEAS - MV MEAN PG: 1.31 MMHG
BH CV ECHO MEAS - MV P1/2T: 77.3 MSEC
BH CV ECHO MEAS - MV V2 VTI: 27.8 CM
BH CV ECHO MEAS - MVA(P1/2T): 2.8 CM2
BH CV ECHO MEAS - MVA(VTI): 2.29 CM2
BH CV ECHO MEAS - PA ACC TIME: 0.13 SEC
BH CV ECHO MEAS - PA PR(ACCEL): 18.8 MMHG
BH CV ECHO MEAS - PA V2 MAX: 94.6 CM/SEC
BH CV ECHO MEAS - RAP SYSTOLE: 8 MMHG
BH CV ECHO MEAS - RVSP: 25 MMHG
BH CV ECHO MEAS - SV(LVOT): 63.7 ML
BH CV ECHO MEAS - SV(MOD-SP2): 51 ML
BH CV ECHO MEAS - SV(MOD-SP4): 56 ML
BH CV ECHO MEAS - TAPSE (>1.6): 1.23 CM
BH CV ECHO MEAS - TR MAX PG: 17 MMHG
BH CV ECHO MEAS - TR MAX VEL: 206.2 CM/SEC
BH CV ECHO MEASUREMENTS AVERAGE E/E' RATIO: 6.91
BH CV VAS BP LEFT ARM: NORMAL MMHG
BH CV XLRA - RV BASE: 3.3 CM
BH CV XLRA - RV LENGTH: 7.2 CM
BH CV XLRA - RV MID: 2.45 CM
BH CV XLRA - TDI S': 9.6 CM/SEC
BH CV XLRA MEAS LEFT DIST CCA EDV: 28.2 CM/SEC
BH CV XLRA MEAS LEFT DIST CCA PSV: 103 CM/SEC
BH CV XLRA MEAS LEFT DIST ICA EDV: -38.1 CM/SEC
BH CV XLRA MEAS LEFT DIST ICA PSV: -137 CM/SEC
BH CV XLRA MEAS LEFT ICA/CCA RATIO: 1.15
BH CV XLRA MEAS LEFT MID CCA EDV: 24.3 CM/SEC
BH CV XLRA MEAS LEFT MID CCA PSV: 116 CM/SEC
BH CV XLRA MEAS LEFT MID ICA EDV: -36.4 CM/SEC
BH CV XLRA MEAS LEFT MID ICA PSV: -128 CM/SEC
BH CV XLRA MEAS LEFT PROX CCA EDV: 32.1 CM/SEC
BH CV XLRA MEAS LEFT PROX CCA PSV: 118 CM/SEC
BH CV XLRA MEAS LEFT PROX ECA EDV: -27.4 CM/SEC
BH CV XLRA MEAS LEFT PROX ECA PSV: -184 CM/SEC
BH CV XLRA MEAS LEFT PROX ICA EDV: -27.7 CM/SEC
BH CV XLRA MEAS LEFT PROX ICA PSV: -133 CM/SEC
BH CV XLRA MEAS LEFT PROX SCLA PSV: 193 CM/SEC
BH CV XLRA MEAS LEFT VERTEBRAL A EDV: 11.6 CM/SEC
BH CV XLRA MEAS LEFT VERTEBRAL A PSV: 107 CM/SEC
BH CV XLRA MEAS RIGHT DIST CCA EDV: 24.2 CM/SEC
BH CV XLRA MEAS RIGHT DIST CCA PSV: 71.4 CM/SEC
BH CV XLRA MEAS RIGHT DIST ICA EDV: -37 CM/SEC
BH CV XLRA MEAS RIGHT DIST ICA PSV: -163 CM/SEC
BH CV XLRA MEAS RIGHT ICA/CCA RATIO: 3.23
BH CV XLRA MEAS RIGHT MID CCA EDV: 18.6 CM/SEC
BH CV XLRA MEAS RIGHT MID CCA PSV: 90.7 CM/SEC
BH CV XLRA MEAS RIGHT MID ICA EDV: -55.5 CM/SEC
BH CV XLRA MEAS RIGHT MID ICA PSV: -244 CM/SEC
BH CV XLRA MEAS RIGHT PROX CCA EDV: 19.3 CM/SEC
BH CV XLRA MEAS RIGHT PROX CCA PSV: 76.4 CM/SEC
BH CV XLRA MEAS RIGHT PROX ECA EDV: 71.3 CM/SEC
BH CV XLRA MEAS RIGHT PROX ECA PSV: 541 CM/SEC
BH CV XLRA MEAS RIGHT PROX ICA EDV: -36.4 CM/SEC
BH CV XLRA MEAS RIGHT PROX ICA PSV: -293 CM/SEC
BH CV XLRA MEAS RIGHT PROX SCLA PSV: 148 CM/SEC
BH CV XLRA MEAS RIGHT VERTEBRAL A EDV: 12.3 CM/SEC
BH CV XLRA MEAS RIGHT VERTEBRAL A PSV: 69.8 CM/SEC
BUN SERPL-MCNC: 12 MG/DL (ref 8–23)
BUN/CREAT SERPL: 10.6 (ref 7–25)
CALCIUM SPEC-SCNC: 8.5 MG/DL (ref 8.6–10.5)
CHLORIDE SERPL-SCNC: 106 MMOL/L (ref 98–107)
CO2 SERPL-SCNC: 26 MMOL/L (ref 22–29)
CREAT SERPL-MCNC: 1.13 MG/DL (ref 0.76–1.27)
EGFRCR SERPLBLD CKD-EPI 2021: 69.1 ML/MIN/1.73
GLUCOSE SERPL-MCNC: 134 MG/DL (ref 65–99)
LEFT ATRIUM VOLUME INDEX: 20.6 ML/M2
MAGNESIUM SERPL-MCNC: 2.1 MG/DL (ref 1.6–2.4)
MAXIMAL PREDICTED HEART RATE: 148 BPM
MAXIMAL PREDICTED HEART RATE: 148 BPM
PA ADP PRP-ACNC: 181 PRU
PHOSPHATE SERPL-MCNC: 3.3 MG/DL (ref 2.5–4.5)
POTASSIUM SERPL-SCNC: 4.1 MMOL/L (ref 3.5–5.2)
RIGHT ARM BP: NORMAL MMHG
SODIUM SERPL-SCNC: 140 MMOL/L (ref 136–145)
STRESS TARGET HR: 126 BPM
STRESS TARGET HR: 126 BPM

## 2022-08-03 PROCEDURE — 99233 SBSQ HOSP IP/OBS HIGH 50: CPT | Performed by: PSYCHIATRY & NEUROLOGY

## 2022-08-03 PROCEDURE — 84100 ASSAY OF PHOSPHORUS: CPT | Performed by: INTERNAL MEDICINE

## 2022-08-03 PROCEDURE — 99239 HOSP IP/OBS DSCHRG MGMT >30: CPT | Performed by: INTERNAL MEDICINE

## 2022-08-03 PROCEDURE — 80048 BASIC METABOLIC PNL TOTAL CA: CPT | Performed by: INTERNAL MEDICINE

## 2022-08-03 PROCEDURE — 85576 BLOOD PLATELET AGGREGATION: CPT | Performed by: PSYCHIATRY & NEUROLOGY

## 2022-08-03 PROCEDURE — 83735 ASSAY OF MAGNESIUM: CPT | Performed by: INTERNAL MEDICINE

## 2022-08-03 RX ORDER — ATORVASTATIN CALCIUM 80 MG/1
80 TABLET, FILM COATED ORAL NIGHTLY
Qty: 30 TABLET | Refills: 0 | Status: SHIPPED | OUTPATIENT
Start: 2022-08-03

## 2022-08-03 RX ADMIN — ASPIRIN 325 MG ORAL TABLET 325 MG: 325 PILL ORAL at 09:27

## 2022-08-03 RX ADMIN — CLOPIDOGREL BISULFATE 75 MG: 75 TABLET ORAL at 09:27

## 2022-08-03 NOTE — PLAN OF CARE
Goal Outcome Evaluation:           Progress: improving  Outcome Evaluation: NIH was a 3. PAtient SBP below 120, stroke navigator called and said to monitor. VSS. UOP adequate. Neuro the same.

## 2022-08-03 NOTE — DISCHARGE SUMMARY
[facility]   DISCHARGE SUMMARY    Patient Name: Cholo Lion  : 1950  MRN: [id number]    Date of Admission: 2022  Date of Discharge: 2022  Primary Care Physician: Mayra Brewer APRN    Consults     Date and Time Order Name Status Description    2022 10:42 AM Inpatient Neurology Consult Stroke Completed           Hospital Course     Presenting Problem:   Acute CVA (cerebrovascular accident) (HCC) [I63.9]      Active and Resolved Problems  Active Hospital Problems    Diagnosis  POA   • **Acute CVA (cerebrovascular accident) (HCC) [I63.9]  Yes   • Ischemic cardiomyopathy [I25.5]  Yes   • COPD (chronic obstructive pulmonary disease) (HCC) [J44.9]  Yes   • Tobacco abuse [Z72.0]  Yes   • Essential hypertension [I10]  Yes   • Coronary artery disease involving native coronary artery of native heart without angina pectoris [I25.10]  Yes      Resolved Hospital Problems   No resolved problems to display.         Hospital Course:  Cholo Lion is a 72 y.o. male with a history of CAD s/p CABG, HTN, HLD, COPD, Tobacco use, Afib (no anticoagulation), Ischemic cardiomyopathy s/p ICD placement and chronic systolic and diastolic heart failure (EF 20% and Grade III diastolic dysfunction on echo from 2016) who presented to the [facility] ED with acute onset of right upper extremity weakness and inability to use his right leg. CT head was performed and showed no acute intracranial abnormality. He was given TNK. CT perfusion and CTA of the head and neck were unremarkable. He was admitted to the ICU for ongoing care. His initial NIHSS was a 7.     He was followed by Stroke Neurology throughout the admission. MRI of the brain was performed 24 hours post-TNK and showed tiny focal diffusion signal abnormality present along the cortical margin of the left postcentral gyrus near the vertex consistent with a probable small acute infarct.     Echocardiogram was performed and showed reduced  systolic function with an EF of 22% and saline results were negative. This was consistent with a prior echocardiogram from 2016. His pacemaker/ICD was interrogated and was negative for Afib.     He was evaluated by physical therapy who recommended home with assistance.     He will continue on ASA 81mg and Plavix. P2Y12 on the day of discharge was consistent with Plavix response. His Atorvastatin will be increased to 80mg nightly. He was counseled on smoking cessation.     NIHSS on the day of discharge was a 1.       Discharge Follow Up Recommendations for labs/diagnostics:  He will need to follow up with Neurology in 4-6 weeks.     Day of Discharge     HPI:   No acute events overnight. He was able to ambulate without assistance.     Vital Signs:  Temp:  [97.6 °F (36.4 °C)-98.5 °F (36.9 °C)] 98.3 °F (36.8 °C)  Heart Rate:  [69-82] 73  Resp:  [16-20] 18  BP: ()/() 96/81    Physical Exam:  Telemetry:  Normal sinus rhythm.    Constitutional:  No acute distress.  Sitting up in a chair.    Eyes: No scleral icterus.   PERRL, EOM intact.    Neck:  Supple, FROM   Cardiovascular: Normal rate, regular and rhythm. Normal heart sounds.  No murmurs, gallop or rub.   Respiratory: No respiratory distress. Normal respiratory effort.  Diminished bilaterally.  No wheezing.    Abdominal:  Soft. No masses. Nontender. No distension. No HSM.   Extremities: No digital cyanosis. No clubbing.  No peripheral edema.   Skin: No rashes, lesions or ulcers   Neurological:             Alert and Oriented to person, place, and time.     Interval:  (shift change)  1a. Level of Consciousness: 0-->Alert, keenly responsive  1b. LOC Questions: 0-->Answers both questions correctly  1c. LOC Commands: 0-->Performs both tasks correctly  2. Best Gaze: 0-->Normal  3. Visual: 0-->No visual loss  4. Facial Palsy: 0-->Normal symmetrical movements  5a. Motor Arm, Left: 0-->No drift, limb holds 90 (or 45) degrees for full 10 secs  5b. Motor Arm, Right:  0-->No drift, limb holds 90 (or 45) degrees for full 10 secs  6a. Motor Leg, Left: 0-->No drift, leg holds 30 degree position for full 5 secs  6b. Motor Leg, Right: 0-->No drift, leg holds 30 degree position for full 5 secs  7. Limb Ataxia: 0-->Absent  8. Sensory: 1-->Mild-to-moderate sensory loss, patient feels pinprick is less sharp or is dull on the affected side, or there is a loss of superficial pain with pinprick, but patient is aware of being touched  9. Best Language: 0-->No aphasia, normal  10. Dysarthria: 0-->Normal  11. Extinction and Inattention (formerly Neglect): 0-->No abnormality    Total (NIH Stroke Scale): 1    Pertinent  and/or Most Recent Results     LAB RESULTS:        Lab 08/02/22  0328 08/01/22  1149 08/01/22  1104 08/01/22  1058 08/01/22  1056   WBC 11.86*  --  10.22  --   --    HEMOGLOBIN 13.3  --  14.2  --   --    HEMOGLOBIN, POC  --   --   --  14.6  --    HEMATOCRIT 40.8  --  43.8  --   --    HEMATOCRIT POC  --   --   --  43  --    PLATELETS 203  --  232  --   --    NEUTROS ABS  --   --  6.46  --   --    IMMATURE GRANS (ABS)  --   --  0.05  --   --    LYMPHS ABS  --   --  2.52  --   --    MONOS ABS  --   --  0.82  --   --    EOS ABS  --   --  0.31  --   --    MCV 92.5  --  92.4  --   --    PROTIME  --   --   --   --  11.1*   APTT  --  29.6  --   --   --          Lab 08/03/22  0151 08/02/22  0328 08/01/22  1058   SODIUM 140 146*  --    POTASSIUM 4.1 4.5  --    CHLORIDE 106 112*  --    CO2 26.0 26.0  --    ANION GAP 8.0 8.0  --    BUN 12 10  --    CREATININE 1.13 1.14 1.00   EGFR 69.1 68.3 80.0   GLUCOSE 134* 110*  --    CALCIUM 8.5* 8.5*  --    MAGNESIUM 2.1 2.0  --    PHOSPHORUS 3.3 3.5  --    HEMOGLOBIN A1C  --  5.60  --          Lab 08/01/22  1149   ALT (SGPT) 8   AST (SGOT) 11         Lab 08/01/22  1149 08/01/22  1056   TROPONIN T <0.010  --    PROTIME  --  11.1*   INR  --  0.9         Lab 08/02/22  0328   CHOLESTEROL 105   LDL CHOL 46   HDL CHOL 31*   TRIGLYCERIDES 167*              Brief Urine Lab Results     None        Microbiology Results (last 10 days)     Procedure Component Value - Date/Time    COVID PRE-OP / PRE-PROCEDURE SCREENING ORDER (NO ISOLATION) - Swab, Nasopharynx [[id number]]  (Normal) Collected: 08/01/22 1526    Lab Status: Final result Specimen: Swab from Nasopharynx Updated: 08/01/22 1645    Narrative:      The following orders were created for panel order COVID PRE-OP / PRE-PROCEDURE SCREENING ORDER (NO ISOLATION) - Swab, Nasopharynx.  Procedure                               Abnormality         Status                     ---------                               -----------         ------                     COVID-19 and FLU A/B PCR...[[id number]]  Normal              Final result                 Please view results for these tests on the individual orders.    COVID-19 and FLU A/B PCR - Swab, Nasopharynx [[id number]]  (Normal) Collected: 08/01/22 1526    Lab Status: Final result Specimen: Swab from Nasopharynx Updated: 08/01/22 1645     COVID19 Not Detected     Influenza A PCR Not Detected     Influenza B PCR Not Detected    Narrative:      Fact sheet for providers: https://www.fda.gov/media/050364/download    Fact sheet for patients: https://www.fda.gov/media/766935/download    Test performed by PCR.          CT Angiogram Neck    Result Date: 8/1/2022  Impression: Grossly symmetric cerebral perfusion with mild delayed perfusion in the bilateral watershed regions including small region of Tmax >6 seconds (7 mm) in the left corona radiata, without evidence of core infarct.  Essentially normal CTA of the head.  Moderate atherosclerotic narrowing of the origin of the right ICA by approximately 75% per NASCET criteria.  Severe narrowing of the origin of the right external carotid artery.  This report was finalized on 8/1/2022 11:58 AM by Kev Wright MD.      MRI Brain Without Contrast    Result Date: 8/2/2022  Impression: Tiny focal diffusion signal abnormality present along  the cortical margin of the left postcentral gyrus near the vertex, probable small acute infarct. There is additional FLAIR signal abnormality present involving a small area of cortex within the left frontal lobe. The second finding may reflect some reperfusion changes within an area of transient ischemia, given recent administration of thrombolytics.  There is otherwise mild age-related change as above. No additional acute ischemia, hemorrhage, mass or mass effect.  This report was finalized on 8/2/2022 12:26 PM by Prabhjot Sauceda.      XR Chest 1 View    Result Date: 8/1/2022  Impression: 1.  Some suggested chronic changes at the lung bases.  This report was finalized on 8/1/2022 11:47 AM by Rolando Solorzano MD.      CT Head Without Contrast Stroke Protocol    Result Date: 8/1/2022  Impression: 1.  An acute intracranial abnormality is not appreciated.  . Study was reviewed and discussed with team navigator at 10:42 AM.  This report was finalized on 8/1/2022 10:51 AM by Rolando Solorzano MD.      CT Angiogram Head w AI Analysis of LVO    Result Date: 8/1/2022  Impression: Grossly symmetric cerebral perfusion with mild delayed perfusion in the bilateral watershed regions including small region of Tmax >6 seconds (7 mm) in the left corona radiata, without evidence of core infarct.  Essentially normal CTA of the head.  Moderate atherosclerotic narrowing of the origin of the right ICA by approximately 75% per NASCET criteria.  Severe narrowing of the origin of the right external carotid artery.  This report was finalized on 8/1/2022 11:58 AM by Kev Wright MD.      CT CEREBRAL PERFUSION WITH & WITHOUT CONTRAST    Result Date: 8/1/2022  Impression: Grossly symmetric cerebral perfusion with mild delayed perfusion in the bilateral watershed regions including small region of Tmax >6 seconds (7 mm) in the left corona radiata, without evidence of core infarct.  Essentially normal CTA of the head.  Moderate atherosclerotic  narrowing of the origin of the right ICA by approximately 75% per NASCET criteria.  Severe narrowing of the origin of the right external carotid artery.  This report was finalized on 8/1/2022 11:58 AM by Kev Wright MD.        Results for orders placed during the hospital encounter of 08/01/22    Duplex Carotid Ultrasound CAR    Interpretation Summary  · Right internal carotid artery stenosis of >70%.  · Left internal carotid artery stenosis of 0-49%.      Results for orders placed during the hospital encounter of 08/01/22    Duplex Carotid Ultrasound CAR    Interpretation Summary  · Right internal carotid artery stenosis of >70%.  · Left internal carotid artery stenosis of 0-49%.      Results for orders placed during the hospital encounter of 08/01/22    Adult Transthoracic Echo Complete W/ Cont if Necessary Per Protocol (With Agitated Saline)    Interpretation Summary  · Saline test results are negative.  · The left ventricular cavity is dilated.  · Estimated right ventricular systolic pressure from tricuspid regurgitation is normal (<35 mmHg). Calculated right ventricular systolic pressure from tricuspid regurgitation is 25 mmHg.  · Global LV hypokinesis  · MAC with mild MR        Discharge Details        Discharge Medications      Changes to Medications      Instructions Start Date   atorvastatin 80 MG tablet  Commonly known as: LIPITOR  What changed:   · medication strength  · how much to take  · when to take this   80 mg, Oral, Nightly      clopidogrel 75 MG tablet  Commonly known as: PLAVIX  What changed: when to take this   75 mg, Oral, Daily      spironolactone 25 MG tablet  Commonly known as: ALDACTONE  What changed: when to take this   25 mg, Oral, Daily         Continue These Medications      Instructions Start Date   aspirin 325 MG tablet   325 mg, Oral, Every Morning      carvedilol 3.125 MG tablet  Commonly known as: COREG   3.125 mg, Oral, Every 12 Hours Scheduled      ezetimibe 10 MG  tablet  Commonly known as: ZETIA   10 mg, Oral, Daily      nitroglycerin 0.4 MG SL tablet  Commonly known as: NITROSTAT   0.4 mg, Sublingual, Every 5 Minutes PRN, Take no more than 3 doses in 15 minutes.      sacubitril-valsartan 24-26 MG tablet  Commonly known as: ENTRESTO   1 tablet, Oral, 2 Times Daily             Allergies   Allergen Reactions   • Bactrim [Sulfamethoxazole-Trimethoprim]      UNKNOWN   • Lopressor [Metoprolol Tartrate]      hypotension   • Neosporin [Neomycin-Bacitracin Zn-Polymyx] Rash         Discharge Disposition:  Home or Self Care    Diet:  Hospital:  Diet Order   Procedures   • Diet Regular; Cardiac         Discharge Activity:  As tolerated.         CODE STATUS:    There are no questions and answers to display.         No future appointments.        Time spent on Discharge including face to face service:  32 minutes    Kate Garcia, DO

## 2022-08-03 NOTE — PROGRESS NOTES
"Neurology       Patient Care Team:  Mayra Brewer APRN as PCP - General (Family Medicine)  Mayra Brewer APRN as Referring Physician (Family Medicine)    Chief complaint right side weakness, s/p TNK      Subjective .     History:    Patient seen today w/ wife and dtgr  He is ready to go home, feels like he is at baseline         Objective     Vital Signs   Blood pressure 120/65, pulse 82, temperature 98.3 °F (36.8 °C), temperature source Oral, resp. rate 18, height 177.8 cm (70\"), weight 85.9 kg (189 lb 6 oz), SpO2 92 %.    Physical Exam:  Elderly man, awake and alert. Attends to me well and follows commands. Speech is clear, no aphasia or dysarthria. Eomi. No facial weakness. Full strength w/ nml tone and bulk. Sensation intact. No ataxia. Very poor hearing. On room air, even respirations. Paced rhythm.   Results Review:     P2Y12 181    Echo  · Saline test results are negative.  · The left ventricular cavity is dilated.  · Estimated right ventricular systolic pressure from tricuspid regurgitation is normal (<35 mmHg). Calculated right ventricular systolic pressure from tricuspid regurgitation is 25 mmHg.  · Global LV hypokinesis  · MAC with mild MR    Carotid u/s  · Right internal carotid artery stenosis of >70%.  · Left internal carotid artery stenosis of 0-49%.      Assessment & Plan     73 y/o man with history of hypertension, hyperlipidemia, smoker, coronary artery disease s/p CABG, pacemaker/defibrillator, presents with right sided weakness, s/p TNK.      Cardiology has interrogated his pacemaker - no atrial fibrillation detected.      MRI brain has reviewed punctate stroke, left frontal gyrus, suspect embolic in nature. Since no afib on pacemaker, suspect etiology is artery to artery.     2d echo shows no pfo; known heart failure unchanged since 2016.      No hemorrhage detected.     Will place back on asa/plavix and high dose statin. He is a plavix responder.    Discussed smoking " cessation again today/he is willing to consider.     2d echo shows right ICA >70%. Will arrange for followup in Neurovascular clinic.         Plan  -asa 325  -plavix 75  -atorvastatin 80  -follow in Stroke clinic and neurovascular clinic   -smoking cessation       Time spent >35       I discussed the patients findings and my recommendations with patient, wife, dgtr, primary team       Breanna Morton MD  08/03/22  10:24 EDT

## 2022-08-03 NOTE — CASE MANAGEMENT/SOCIAL WORK
Continued Stay Note  [facility]     Patient Name: Cholo Lion  MRN: [id number]  Today's Date: 8/3/2022    Admit Date: 8/1/2022     Discharge Plan     Row Name 08/03/22 1133       Plan    Plan Home    Patient/Family in Agreement with Plan yes    Plan Comments I spoke with Mr. Lion and family at the bedside today. Pt is being D/C home today. PT/OT recommended Outpatient PT. Pt and family refused. They state pt already owes money to OP PT in their city and that they will work with him at home. Pt and family state that PT has come to their home before and shown them everything they need to do. Pt states he has all the medical equipment he needs at the house. Family states they have a few Rolling Walkers, Wheelchair, and Bedside Commode. Family will transport pt home. No needs voiced or identified.    Final Discharge Disposition Code 01 - home or self-care               Discharge Codes    No documentation.               Expected Discharge Date and Time     Expected Discharge Date Expected Discharge Time    Aug 3, 2022             Luba Dykes RN

## 2022-08-04 NOTE — OUTREACH NOTE
Prep Survey    Flowsheet Row Responses   [facility] facility patient discharged from? [place]   Is LACE score < 7 ? No   Emergency Room discharge w/ pulse ox? No   Eligibility Readm Mgmt   Discharge diagnosis Acute CVA   Does the patient have one of the following disease processes/diagnoses(primary or secondary)? Stroke (TIA)   Does the patient have Home health ordered? No   Is there a DME ordered? No   Prep survey completed? Yes          ADELAIDE CHAVEZ - Registered Nurse

## 2022-08-09 ENCOUNTER — READMISSION MANAGEMENT (OUTPATIENT)
Dept: CALL CENTER | Facility: HOSPITAL | Age: 72
End: 2022-08-09

## 2022-08-09 NOTE — OUTREACH NOTE
Stroke Week 1 Survey    Flowsheet Row Responses   [facility] [place] by [facility] patient discharged from? [place]   Does the patient have one of the following disease processes/diagnoses(primary or secondary)? Stroke (TIA)   Week 1 attempt successful? Yes   Call start time 1621   Call end time 1625   Discharge diagnosis Acute CVA   Meds reviewed with patient/caregiver? Yes   Is the patient having any side effects they believe may be caused by any medication additions or changes? No   Does the patient have all medications ordered at discharge? Yes   Is the patient taking all medications as directed (includes completed medication regime)? Yes   Does the patient have a primary care provider?  Yes   Does the patient have an appointment with their PCP within 7 days of discharge? Greater than 7 days   What is preventing the patient from scheduling follow up appointments within 7 days of discharge? Earlier appointment not available   Has the patient kept scheduled appointments due by today? N/A   Comments Has appt with Neuro on 8/22.  Waiting to hear from Dr. Lala's office for an appt.   Psychosocial issues? No   Does the patient require any assistance with activities of daily living such as eating, bathing, dressing, walking, etc.? No   Does the patient have any residual symptoms from stroke/TIA? No   Does the patient understand the diet ordered at discharge? Yes   Did the patient receive a copy of their discharge instructions? Yes   Nursing interventions Reviewed instructions with patient   What is the patient's perception of their health status since discharge? Improving   Nursing interventions Nurse provided patient education   Is the patient able to teach back FAST for Stroke? Yes   Is the patient/caregiver able to teach back the risk factors for a stroke? High blood pressure-goal below 120/80, High Cholesterol, History of TIAs, Sleep apnea, Smoking   Is the patient/caregiver able to teach back signs and symptoms related to  disease process for when to call PCP? Yes   Is the patient/caregiver able to teach back signs and symptoms related to disease process for when to call 911? Yes   If the patient is a current smoker, are they able to teach back resources for cessation? Smoking cessation medications, Smoking cessation support groups   Is the patient/caregiver able to teach back the hierarchy of who to call/visit for symptoms/problems? PCP, Specialist, Home health nurse, Urgent Care, ED, 911 Yes   Additional teach back comments States he is doing a lot better.  States he just got back from [place] and doing well.   Week 1 call completed? Yes   Wrap up additional comments Denies questions or needs at this time.          CONNIE CHAVEZ - Licensed Nurse

## 2022-08-22 ENCOUNTER — OFFICE VISIT (OUTPATIENT)
Dept: NEUROSURGERY | Facility: CLINIC | Age: 72
End: 2022-08-22

## 2022-08-22 VITALS
BODY MASS INDEX: 28.2 KG/M2 | WEIGHT: 197 LBS | HEIGHT: 70 IN | SYSTOLIC BLOOD PRESSURE: 110 MMHG | DIASTOLIC BLOOD PRESSURE: 62 MMHG | TEMPERATURE: 97.1 F

## 2022-08-22 DIAGNOSIS — I65.21 ASYMPTOMATIC STENOSIS OF RIGHT CAROTID ARTERY: Primary | ICD-10-CM

## 2022-08-22 PROCEDURE — 99203 OFFICE O/P NEW LOW 30 MIN: CPT | Performed by: STUDENT IN AN ORGANIZED HEALTH CARE EDUCATION/TRAINING PROGRAM

## 2022-08-22 NOTE — PROGRESS NOTES
Patient: Cholo Lion  : 1950    Primary Care Provider: Mayra Brewer APRN    Requesting Provider: As above      Chief Complaint: Hospital Follow Up Visit (CVA)      History of Present Illness: This is a 72 y.o. male who presented to the hospital earlier this month with a left hemispheric infarct.  He underwent stroke imaging work-up which revealed asymptomatic stenosis of approximately 70% of the right internal carotid artery.  He was placed on dual antiplatelet therapy and referred to me for further evaluation.  In talking the patient, he was aware of the stenosis and he is followed every 6 months by Dr. Lala with carotid Dopplers.  Currently, he denies any symptoms on the left side of his body or changes in the vision of his right eye.  He feels well overall.    PMHX  Allergies:  Allergies   Allergen Reactions   • Bactrim [Sulfamethoxazole-Trimethoprim]      UNKNOWN   • Lopressor [Metoprolol Tartrate]      hypotension   • Neosporin [Neomycin-Bacitracin Zn-Polymyx] Rash     Medications    Current Outpatient Medications:   •  aspirin 325 MG tablet, Take 325 mg by mouth Every Morning., Disp: , Rfl:   •  atorvastatin (LIPITOR) 80 MG tablet, Take 1 tablet by mouth Every Night., Disp: 30 tablet, Rfl: 0  •  carvedilol (COREG) 3.125 MG tablet, Take 1 tablet by mouth every 12 (twelve) hours., Disp: 60 tablet, Rfl: 12  •  clopidogrel (PLAVIX) 75 MG tablet, Take 1 tablet by mouth daily. (Patient taking differently: Take 75 mg by mouth Every Morning.), Disp: 30 tablet, Rfl: 12  •  ezetimibe (ZETIA) 10 MG tablet, Take 10 mg by mouth Daily., Disp: , Rfl:   •  nitroglycerin (NITROSTAT) 0.4 MG SL tablet, Place 1 tablet under the tongue every 5 (five) minutes as needed for chest pain for up to 3 doses. Take no more than 3 doses in 15 minutes., Disp: 25 tablet, Rfl: 12  •  sacubitril-valsartan (ENTRESTO) 24-26 MG tablet, Take 1 tablet by mouth 2 (Two) Times a Day., Disp: , Rfl:   •  spironolactone  (ALDACTONE) 25 MG tablet, Take 1 tablet by mouth daily. (Patient taking differently: Take 25 mg by mouth Every Morning.), Disp: 30 tablet, Rfl: 12  Past Medical History:  Past Medical History:   Diagnosis Date   • Bundle branch block, right    • Cancer (HCC)    • Cardiomyopathy (HCC)    • Chest pain    • CHF (congestive heart failure) (HCC)    • COPD (chronic obstructive pulmonary disease) (HCC) 12/19/2016   • Coronary artery disease    • Heart attack (HCC) 1993   • History of shingles    • Hyperlipidemia    • Hypertension    • Lung nodules 12/19/2016   • Tremors of nervous system      Past Surgical History:  Past Surgical History:   Procedure Laterality Date   • BIVENTRICULLAR IMPLANTABLE CARDIOVERTER DEFIBRILLATOR PLACEMENT     • CARDIAC CATHETERIZATION N/A 8/10/2016    Procedure: Left Heart Cath;  Surgeon: Prabhjot Lala MD;  Location:  [facility];  Service:    • CARDIAC CATHETERIZATION N/A 11/13/2019    Procedure: Left Heart Cath;  Surgeon: Prabhjot Lala MD;  Location:  [facility];  Service: Cardiovascular   • CARDIAC ELECTROPHYSIOLOGY PROCEDURE N/A 12/30/2016    Procedure: Implant ICD - bi ventricular;  Surgeon: Espinoza Benjamin DO;  Location:  [facility] INVASIVE [facility]ATION;  Service:    • COLON SURGERY  06/26/2016   • CORONARY ARTERY BYPASS GRAFT     • LIPOMA EXCISION      abdomen   • OTHER SURGICAL HISTORY      heart stents     Social Hx:  Social History     Tobacco Use   • Smoking status: Current Every Day Smoker     Packs/day: 0.50     Types: Cigarettes   • Smokeless tobacco: Never Used   Substance Use Topics   • Alcohol use: Yes     Comment: OCCASIONAL; liquor or beer only on occasion   • Drug use: No     Family Hx:  Family History   Problem Relation Age of Onset   • Heart attack Brother    • No Known Problems Mother    • No Known Problems Father    • Clotting disorder Sister         clot traveled to heart     Review of Systems:        Review of Systems    Constitutional: Negative for activity change, appetite change, chills, diaphoresis, fatigue, fever and unexpected weight change.   HENT: Positive for hearing loss and sinus pressure. Negative for congestion, dental problem, drooling, ear discharge, ear pain, facial swelling, mouth sores, nosebleeds, postnasal drip, rhinorrhea, sneezing, sore throat, tinnitus, trouble swallowing and voice change.    Eyes: Negative for photophobia, pain, discharge, redness, itching and visual disturbance.   Respiratory: Positive for cough and shortness of breath. Negative for apnea, choking, chest tightness, wheezing and stridor.    Cardiovascular: Positive for leg swelling. Negative for chest pain and palpitations.   Gastrointestinal: Negative for abdominal distention, abdominal pain, anal bleeding, blood in stool, constipation, diarrhea, nausea, rectal pain and vomiting.   Endocrine: Negative for cold intolerance, heat intolerance, polydipsia, polyphagia and polyuria.   Genitourinary: Negative for decreased urine volume, difficulty urinating, dysuria, enuresis, flank pain, frequency, genital sores, hematuria and urgency.   Musculoskeletal: Negative for arthralgias, back pain, gait problem, joint swelling, myalgias, neck pain and neck stiffness.   Skin: Negative for color change, pallor, rash and wound.   Allergic/Immunologic: Negative for environmental allergies, food allergies and immunocompromised state.   Neurological: Positive for tremors and headaches. Negative for dizziness, seizures, syncope, facial asymmetry, speech difficulty, weakness, light-headedness and numbness.   Hematological: Negative for adenopathy. Bruises/bleeds easily.   Psychiatric/Behavioral: Negative for agitation, behavioral problems, confusion, decreased concentration, dysphoric mood, hallucinations, self-injury, sleep disturbance and suicidal ideas. The patient is not nervous/anxious and is not hyperactive.    All other systems reviewed and are  "negative.       Physical Exam:   /62 (BP Location: Left arm, Patient Position: Sitting, Cuff Size: Adult)   Temp 97.1 °F (36.2 °C) (Infrared)   Ht 177.8 cm (70\")   Wt 89.4 kg (197 lb)   BMI 28.27 kg/m²   Awake, alert and oriented x 3  Speech f/c  Opens eyes spont  Pupils 3 mm rx bilaterally  Extraocular muscles intact bilaterally  Normal sensation to light touch in all 3 distributions of CN V bilaterally  Face symmetric bilaterally  Tongue midline  5/5 in all 4 ext  Normal sensation to light touch in all 4 ext  2+DTR's  No rankin's or clonus bilaterally  No pronator drift or dysmetria  Gait not assessed    Diagnostic Studies:  All neurological imaging studies were independently reviewed unless stated otherwise    Assessment/Plan:  This is a 72 y.o. male who presented to the hospital earlier this month with a small left hemispheric infarct.  The patient has made a good recovery from his stroke.  He was found to have asymptomatic severe stenosis of the right internal carotid artery.  Initially, he was referred to me to monitor this.  In talking to the patient, however, he is aware of the stenosis and is followed by Dr. Lala regularly with carotid Dopplers every 6 months.  He is due to see him at the end of this year.  I explained to the patient he does not seem to be having any symptoms associated with the stenosis.  I recommended he continue his dual antiplatelet therapy.  I encouraged him to maintain his follow-up with Dr. Lala.  I am available if there is any need for intervention at some point down the road.    Diagnoses and all orders for this visit:    1. Asymptomatic stenosis of right carotid artery (Primary)        Dameon Patton MD  08/22/22  13:43 EDT  "

## 2022-08-23 ENCOUNTER — PATIENT ROUNDING (BHMG ONLY) (OUTPATIENT)
Dept: NEUROSURGERY | Facility: CLINIC | Age: 72
End: 2022-08-23

## 2022-08-23 NOTE — PROGRESS NOTES
A MY-CHART MESSAGE HAS BEEN SENT TO THE PATIENT FOR PATIENT ROUNDING WITH [facility]UROSURGERY.

## 2022-10-20 ENCOUNTER — CALL CENTER PROGRAMS (OUTPATIENT)
Dept: CALL CENTER | Facility: HOSPITAL | Age: 72
End: 2022-10-20

## 2022-10-20 NOTE — OUTREACH NOTE
Stroke [place] Survey    Flowsheet Row Responses   Facility patient discharged from? [facility]   Attempt successful Yes   Call start time 1507   Person spoke with today (if not patient) and relationship Caregiver  [Nigilberto, Nessa Madrid]   Call end time 1513   Patient location 30 days post discharge if known Home   Could you live alone without any help from another person? Yes   Can you do everything that you were doing right before your stroke even if slower and not as much? Yes   Are you completely back to the way you were right before your stroke? Yes  [Reports all symptoms resolved. ]   Can you walk from one room to another without help from another person? Yes  [Walks independently. ]   Can the patient sit up in bed without any help? Yes   Call Center Kinga Score 0   Kinga score call completed Yes   Comments Rika reports patient recovered well from the stroke and all symptoms resolved.          ASTRID MEYER - Registered Nurse

## 2024-01-04 ENCOUNTER — HOSPITAL ENCOUNTER (EMERGENCY)
Facility: HOSPITAL | Age: 74
Discharge: HOME | End: 2024-01-04
Attending: EMERGENCY MEDICINE
Payer: MEDICARE

## 2024-01-04 ENCOUNTER — APPOINTMENT (OUTPATIENT)
Dept: GENERAL RADIOLOGY | Facility: HOSPITAL | Age: 74
End: 2024-01-04
Payer: MEDICARE

## 2024-01-04 VITALS
SYSTOLIC BLOOD PRESSURE: 106 MMHG | BODY MASS INDEX: 31.89 KG/M2 | WEIGHT: 198.41 LBS | DIASTOLIC BLOOD PRESSURE: 70 MMHG | OXYGEN SATURATION: 93 % | HEIGHT: 66 IN | TEMPERATURE: 97.6 F | RESPIRATION RATE: 18 BRPM | HEART RATE: 71 BPM

## 2024-01-04 DIAGNOSIS — J20.8 VIRAL BRONCHITIS: Primary | ICD-10-CM

## 2024-01-04 DIAGNOSIS — I49.3 PVC'S (PREMATURE VENTRICULAR CONTRACTIONS): ICD-10-CM

## 2024-01-04 LAB
ANION GAP SERPL CALCULATED.3IONS-SCNC: 8 MMOL/L (ref 5–15)
BASOPHILS # BLD AUTO: 0.05 10*3/MM3 (ref 0–0.2)
BASOPHILS NFR BLD AUTO: 0.5 % (ref 0–1.5)
BUN SERPL-MCNC: 11 MG/DL (ref 8–23)
BUN/CREAT SERPL: 9.7 (ref 7–25)
CALCIUM SPEC-SCNC: 9 MG/DL (ref 8.6–10.5)
CHLORIDE SERPL-SCNC: 104 MMOL/L (ref 98–107)
CO2 SERPL-SCNC: 29 MMOL/L (ref 22–29)
CREAT SERPL-MCNC: 1.13 MG/DL (ref 0.76–1.27)
D-LACTATE SERPL-SCNC: 1.3 MMOL/L (ref 0.5–2)
DEPRECATED RDW RBC AUTO: 46.2 FL (ref 37–54)
EGFRCR SERPLBLD CKD-EPI 2021: 68.6 ML/MIN/1.73
EOSINOPHIL # BLD AUTO: 0.29 10*3/MM3 (ref 0–0.4)
EOSINOPHIL NFR BLD AUTO: 2.9 % (ref 0.3–6.2)
ERYTHROCYTE [DISTWIDTH] IN BLOOD BY AUTOMATED COUNT: 13.3 % (ref 12.3–15.4)
FLUAV RNA RESP QL NAA+PROBE: NOT DETECTED
FLUBV RNA RESP QL NAA+PROBE: NOT DETECTED
GLUCOSE SERPL-MCNC: 148 MG/DL (ref 65–99)
HCT VFR BLD AUTO: 44.4 % (ref 37.5–51)
HGB BLD-MCNC: 14.2 G/DL (ref 13–17.7)
HOLD SPECIMEN: NORMAL
IMM GRANULOCYTES # BLD AUTO: 0.05 10*3/MM3 (ref 0–0.05)
IMM GRANULOCYTES NFR BLD AUTO: 0.5 % (ref 0–0.5)
LYMPHOCYTES # BLD AUTO: 1.84 10*3/MM3 (ref 0.7–3.1)
LYMPHOCYTES NFR BLD AUTO: 18.2 % (ref 19.6–45.3)
MAGNESIUM SERPL-MCNC: 2.1 MG/DL (ref 1.6–2.4)
MCH RBC QN AUTO: 30 PG (ref 26.6–33)
MCHC RBC AUTO-ENTMCNC: 32 G/DL (ref 31.5–35.7)
MCV RBC AUTO: 93.7 FL (ref 79–97)
MONOCYTES # BLD AUTO: 1.02 10*3/MM3 (ref 0.1–0.9)
MONOCYTES NFR BLD AUTO: 10.1 % (ref 5–12)
NEUTROPHILS NFR BLD AUTO: 6.88 10*3/MM3 (ref 1.7–7)
NEUTROPHILS NFR BLD AUTO: 67.8 % (ref 42.7–76)
NRBC BLD AUTO-RTO: 0 /100 WBC (ref 0–0.2)
PLATELET # BLD AUTO: 185 10*3/MM3 (ref 140–450)
PMV BLD AUTO: 10.7 FL (ref 6–12)
POTASSIUM SERPL-SCNC: 4.5 MMOL/L (ref 3.5–5.2)
PROCALCITONIN SERPL-MCNC: 0.06 NG/ML (ref 0–0.25)
QT INTERVAL: 420 MS
QTC INTERVAL: 493 MS
RBC # BLD AUTO: 4.74 10*6/MM3 (ref 4.14–5.8)
RSV RNA NPH QL NAA+NON-PROBE: NOT DETECTED
SARS-COV-2 RNA RESP QL NAA+PROBE: NOT DETECTED
SODIUM SERPL-SCNC: 141 MMOL/L (ref 136–145)
T4 FREE SERPL-MCNC: 1.01 NG/DL (ref 0.93–1.7)
TROPONIN T SERPL HS-MCNC: 11 NG/L
TSH SERPL DL<=0.05 MIU/L-ACNC: 2.08 UIU/ML (ref 0.27–4.2)
WBC NRBC COR # BLD AUTO: 10.13 10*3/MM3 (ref 3.4–10.8)
WHOLE BLOOD HOLD COAG: NORMAL
WHOLE BLOOD HOLD SPECIMEN: NORMAL

## 2024-01-04 PROCEDURE — 93005 ELECTROCARDIOGRAM TRACING: CPT

## 2024-01-04 PROCEDURE — 83735 ASSAY OF MAGNESIUM: CPT | Performed by: EMERGENCY MEDICINE

## 2024-01-04 PROCEDURE — 84484 ASSAY OF TROPONIN QUANT: CPT | Performed by: EMERGENCY MEDICINE

## 2024-01-04 PROCEDURE — 84145 PROCALCITONIN (PCT): CPT | Performed by: EMERGENCY MEDICINE

## 2024-01-04 PROCEDURE — 80048 BASIC METABOLIC PNL TOTAL CA: CPT | Performed by: EMERGENCY MEDICINE

## 2024-01-04 PROCEDURE — 84443 ASSAY THYROID STIM HORMONE: CPT | Performed by: EMERGENCY MEDICINE

## 2024-01-04 PROCEDURE — 87637 SARSCOV2&INF A&B&RSV AMP PRB: CPT | Performed by: EMERGENCY MEDICINE

## 2024-01-04 PROCEDURE — 84439 ASSAY OF FREE THYROXINE: CPT | Performed by: EMERGENCY MEDICINE

## 2024-01-04 PROCEDURE — 71045 X-RAY EXAM CHEST 1 VIEW: CPT

## 2024-01-04 PROCEDURE — 83605 ASSAY OF LACTIC ACID: CPT | Performed by: EMERGENCY MEDICINE

## 2024-01-04 PROCEDURE — 99284 EMERGENCY DEPT VISIT MOD MDM: CPT

## 2024-01-04 PROCEDURE — 85025 COMPLETE CBC W/AUTO DIFF WBC: CPT | Performed by: EMERGENCY MEDICINE

## 2024-01-04 RX ORDER — DEXTROMETHORPHAN HYDROBROMIDE AND PROMETHAZINE HYDROCHLORIDE 15; 6.25 MG/5ML; MG/5ML
5 SYRUP ORAL 4 TIMES DAILY PRN
Qty: 120 ML | Refills: 0 | Status: SHIPPED | OUTPATIENT
Start: 2024-01-04

## 2024-01-04 RX ORDER — SODIUM CHLORIDE 0.9 % (FLUSH) 0.9 %
10 SYRINGE (ML) INJECTION AS NEEDED
Status: DISCONTINUED | OUTPATIENT
Start: 2024-01-04 | End: 2024-01-04 | Stop reason: HOSPADM

## 2024-01-04 RX ORDER — BENZONATATE 200 MG/1
200 CAPSULE ORAL 3 TIMES DAILY PRN
Qty: 20 CAPSULE | Refills: 0 | Status: SHIPPED | OUTPATIENT
Start: 2024-01-04

## 2024-01-04 NOTE — ED PROVIDER NOTES
Subjective   History of Present Illness    Pt presents after referral from PCP office.  He reports that he went to see PCP for a cough and congestion hoping to get some cough medicine.  While there he says they found his HR in the 30s and sent him here.  He denies weakness/dizziness, chest pain, palpitations.  He has not had a fever or vomiting. Denies recent med change. He has a PPM and says he was told a few months ago that it had about a year of battery life left.    History provided by:  Patient      Review of Systems   Constitutional:  Negative for fever.   Respiratory:  Positive for cough. Negative for shortness of breath.    Cardiovascular:  Negative for chest pain and palpitations.   Gastrointestinal:  Negative for abdominal pain and vomiting.   Neurological:  Negative for dizziness.   All other systems reviewed and are negative.      Past Medical History:   Diagnosis Date    Bundle branch block, right     Cancer     Cardiomyopathy     Chest pain     CHF (congestive heart failure)     COPD (chronic obstructive pulmonary disease) 12/19/2016    Coronary artery disease     Heart attack 1993    History of shingles     Hyperlipidemia     Hypertension     Lung nodules 12/19/2016    Tremors of nervous system        Allergies   Allergen Reactions    Bactrim [Sulfamethoxazole-Trimethoprim]      UNKNOWN    Lopressor [Metoprolol Tartrate]      hypotension    Neosporin [Neomycin-Bacitracin Zn-Polymyx] Rash       Past Surgical History:   Procedure Laterality Date    BIVENTRICULLAR IMPLANTABLE CARDIOVERTER DEFIBRILLATOR PLACEMENT      CARDIAC CATHETERIZATION N/A 8/10/2016    Procedure: Left Heart Cath;  Surgeon: Prabhjot Lala MD;  Location:  [facility];  Service:     CARDIAC CATHETERIZATION N/A 11/13/2019    Procedure: Left Heart Cath;  Surgeon: Prabhjot Lala MD;  Location:  [facility];  Service: Cardiovascular    CARDIAC ELECTROPHYSIOLOGY PROCEDURE N/A 12/30/2016     Procedure: Implant ICD - bi ventricular;  Surgeon: Espinoza Benjamin DO;  Location: [facility] INVASIVE LOCATION;  Service:     COLON SURGERY  06/26/2016    CORONARY ARTERY BYPASS GRAFT      LIPOMA EXCISION      abdomen    OTHER SURGICAL HISTORY      heart stents       Family History   Problem Relation Age of Onset    Heart attack Brother     No Known Problems Mother     No Known Problems Father     Clotting disorder Sister         clot traveled to heart       Social History     Socioeconomic History    Marital status:    Tobacco Use    Smoking status: Every Day     Packs/day: .5     Types: Cigarettes    Smokeless tobacco: Never   Substance and Sexual Activity    Alcohol use: Yes     Comment: OCCASIONAL; liquor or beer only on occasion    Drug use: No    Sexual activity: Defer           Objective   Physical Exam  Vitals and nursing note reviewed.   Constitutional:       General: He is not in acute distress.     Appearance: Normal appearance. He is not ill-appearing.   HENT:      Head: Normocephalic and atraumatic.      Mouth/Throat:      Mouth: Mucous membranes are moist.   Eyes:      General: No scleral icterus.        Right eye: No discharge.         Left eye: No discharge.      Conjunctiva/sclera: Conjunctivae normal.   Cardiovascular:      Rate and Rhythm: Normal rate and regular rhythm. Extrasystoles are present.     Heart sounds: No murmur heard.  Pulmonary:      Effort: Pulmonary effort is normal. No respiratory distress.      Breath sounds: Normal breath sounds. No wheezing.   Abdominal:      General: Bowel sounds are normal. There is no distension.      Palpations: Abdomen is soft.      Tenderness: There is no abdominal tenderness. There is no guarding or rebound.   Musculoskeletal:         General: No swelling. Normal range of motion.      Cervical back: Normal range of motion and neck supple.      Right lower leg: No edema.      Left lower leg: No edema.   Skin:     General: Skin is warm and dry.       Findings: No rash.   Neurological:      General: No focal deficit present.      Mental Status: He is alert. Mental status is at baseline.   Psychiatric:         Mood and Affect: Mood normal.         Behavior: Behavior normal.         Thought Content: Thought content normal.         Procedures           ED Course    PPM interrogated and I reviewed the report.  Battery longevity 1.8 years.  No alerts since Nov 17 last cleared.      EKG ASVPR with PVCs.  Labs benign.  CXR NAD.      Suspect the abnormal HR reading was related to PVCs which are somewhat frequent.  PPM appears to be functioning normally.                               Last BP was 106/70 checked by me, the 76/54 in the chart felt to be erronoeous as he had arm above his head at time of measurement.  I went to bedside at that reading and repeated it myself.            Medical Decision Making  Problems Addressed:  PVC's (premature ventricular contractions): complicated acute illness or injury  Viral bronchitis: complicated acute illness or injury    Amount and/or Complexity of Data Reviewed  Labs: ordered.  Radiology: ordered.    Risk  Prescription drug management.        Final diagnoses:   Viral bronchitis   PVC's (premature ventricular contractions)       ED Disposition  ED Disposition       ED Disposition   Discharge    Condition   Stable    Comment   --               Mayra Brewer, APRN  [address] [id number]  [phone]    In 1 week           Medication List        New Prescriptions      benzonatate 200 MG capsule  Commonly known as: TESSALON  Take 1 capsule by mouth 3 (Three) Times a Day As Needed for Cough.     promethazine-dextromethorphan 6.25-15 MG/5ML syrup  Commonly known as: PROMETHAZINE-DM  Take 5 mL by mouth 4 (Four) Times a Day As Needed for Cough.            Changed      clopidogrel 75 MG tablet  Commonly known as: PLAVIX  Take 1 tablet by mouth daily.  What changed: when to take this     spironolactone 25 MG  tablet  Commonly known as: ALDACTONE  Take 1 tablet by mouth daily.  What changed: when to take this               Where to Get Your Medications        These medications were sent to [facility] [address] [place] [address] - [address] [facility] - [phone]  - [phone]   [address] [facility] [address]      Phone: [phone]   benzonatate 200 MG capsule  promethazine-dextromethorphan 6.25-15 MG/5ML syrup            Mason Patricia MD  01/04/24 [id number]

## 2025-06-27 ENCOUNTER — HOSPITAL ENCOUNTER (EMERGENCY)
Facility: HOSPITAL | Age: 75
Discharge: HOME | End: 2025-06-27
Attending: EMERGENCY MEDICINE
Payer: MEDICARE

## 2025-06-27 ENCOUNTER — APPOINTMENT (OUTPATIENT)
Dept: GENERAL RADIOLOGY | Facility: HOSPITAL | Age: 75
End: 2025-06-27
Payer: MEDICARE

## 2025-06-27 VITALS
BODY MASS INDEX: 29.2 KG/M2 | TEMPERATURE: 98.2 F | HEART RATE: 78 BPM | OXYGEN SATURATION: 96 % | HEIGHT: 70 IN | DIASTOLIC BLOOD PRESSURE: 62 MMHG | RESPIRATION RATE: 18 BRPM | WEIGHT: 204 LBS | SYSTOLIC BLOOD PRESSURE: 141 MMHG

## 2025-06-27 DIAGNOSIS — R07.9 CHEST PAIN, UNSPECIFIED TYPE: Primary | ICD-10-CM

## 2025-06-27 LAB
ALBUMIN SERPL-MCNC: 3.5 G/DL (ref 3.5–5.2)
ALBUMIN/GLOB SERPL: 1.5 G/DL
ALP SERPL-CCNC: 95 U/L (ref 39–117)
ALT SERPL W P-5'-P-CCNC: 6 U/L (ref 1–41)
ANION GAP SERPL CALCULATED.3IONS-SCNC: 9.2 MMOL/L (ref 5–15)
AST SERPL-CCNC: 17 U/L (ref 1–40)
BASOPHILS # BLD AUTO: 0.06 10*3/MM3 (ref 0–0.2)
BASOPHILS NFR BLD AUTO: 0.6 % (ref 0–1.5)
BILIRUB SERPL-MCNC: 0.4 MG/DL (ref 0–1.2)
BUN SERPL-MCNC: 9.5 MG/DL (ref 8–23)
BUN/CREAT SERPL: 8.4 (ref 7–25)
CALCIUM SPEC-SCNC: 8.9 MG/DL (ref 8.6–10.5)
CHLORIDE SERPL-SCNC: 107 MMOL/L (ref 98–107)
CO2 SERPL-SCNC: 24.8 MMOL/L (ref 22–29)
CREAT SERPL-MCNC: 1.13 MG/DL (ref 0.76–1.27)
DEPRECATED RDW RBC AUTO: 47.7 FL (ref 37–54)
EGFRCR SERPLBLD CKD-EPI 2021: 68.2 ML/MIN/1.73
EOSINOPHIL # BLD AUTO: 0.21 10*3/MM3 (ref 0–0.4)
EOSINOPHIL NFR BLD AUTO: 2 % (ref 0.3–6.2)
ERYTHROCYTE [DISTWIDTH] IN BLOOD BY AUTOMATED COUNT: 14 % (ref 12.3–15.4)
GEN 5 1HR TROPONIN T REFLEX: 17 NG/L
GLOBULIN UR ELPH-MCNC: 2.4 GM/DL
GLUCOSE SERPL-MCNC: 102 MG/DL (ref 65–99)
HCT VFR BLD AUTO: 43.4 % (ref 37.5–51)
HGB BLD-MCNC: 13.3 G/DL (ref 13–17.7)
HOLD SPECIMEN: NORMAL
IMM GRANULOCYTES # BLD AUTO: 0.04 10*3/MM3 (ref 0–0.05)
IMM GRANULOCYTES NFR BLD AUTO: 0.4 % (ref 0–0.5)
LIPASE SERPL-CCNC: 25 U/L (ref 13–60)
LYMPHOCYTES # BLD AUTO: 1.9 10*3/MM3 (ref 0.7–3.1)
LYMPHOCYTES NFR BLD AUTO: 17.9 % (ref 19.6–45.3)
MCH RBC QN AUTO: 28.2 PG (ref 26.6–33)
MCHC RBC AUTO-ENTMCNC: 30.6 G/DL (ref 31.5–35.7)
MCV RBC AUTO: 92.1 FL (ref 79–97)
MONOCYTES # BLD AUTO: 0.75 10*3/MM3 (ref 0.1–0.9)
MONOCYTES NFR BLD AUTO: 7 % (ref 5–12)
NEUTROPHILS NFR BLD AUTO: 7.68 10*3/MM3 (ref 1.7–7)
NEUTROPHILS NFR BLD AUTO: 72.1 % (ref 42.7–76)
NRBC BLD AUTO-RTO: 0 /100 WBC (ref 0–0.2)
NT-PROBNP SERPL-MCNC: 1497 PG/ML (ref 0–900)
PLATELET # BLD AUTO: 194 10*3/MM3 (ref 140–450)
PMV BLD AUTO: 10 FL (ref 6–12)
POTASSIUM SERPL-SCNC: 4.7 MMOL/L (ref 3.5–5.2)
PROT SERPL-MCNC: 5.9 G/DL (ref 6–8.5)
QT INTERVAL: 494 MS
QT INTERVAL: 506 MS
QTC INTERVAL: 533 MS
QTC INTERVAL: 546 MS
RBC # BLD AUTO: 4.71 10*6/MM3 (ref 4.14–5.8)
SODIUM SERPL-SCNC: 141 MMOL/L (ref 136–145)
TROPONIN T NUMERIC DELTA: 2 NG/L
TROPONIN T SERPL HS-MCNC: 15 NG/L
WBC NRBC COR # BLD AUTO: 10.64 10*3/MM3 (ref 3.4–10.8)
WHOLE BLOOD HOLD COAG: NORMAL
WHOLE BLOOD HOLD SPECIMEN: NORMAL

## 2025-06-27 PROCEDURE — 84484 ASSAY OF TROPONIN QUANT: CPT | Performed by: EMERGENCY MEDICINE

## 2025-06-27 PROCEDURE — 83880 ASSAY OF NATRIURETIC PEPTIDE: CPT | Performed by: EMERGENCY MEDICINE

## 2025-06-27 PROCEDURE — 93005 ELECTROCARDIOGRAM TRACING: CPT | Performed by: EMERGENCY MEDICINE

## 2025-06-27 PROCEDURE — 85025 COMPLETE CBC W/AUTO DIFF WBC: CPT | Performed by: EMERGENCY MEDICINE

## 2025-06-27 PROCEDURE — 83690 ASSAY OF LIPASE: CPT | Performed by: EMERGENCY MEDICINE

## 2025-06-27 PROCEDURE — 99284 EMERGENCY DEPT VISIT MOD MDM: CPT

## 2025-06-27 PROCEDURE — 36415 COLL VENOUS BLD VENIPUNCTURE: CPT

## 2025-06-27 PROCEDURE — 71045 X-RAY EXAM CHEST 1 VIEW: CPT

## 2025-06-27 PROCEDURE — 80053 COMPREHEN METABOLIC PANEL: CPT | Performed by: EMERGENCY MEDICINE

## 2025-06-27 RX ORDER — ASPIRIN 81 MG/1
324 TABLET, CHEWABLE ORAL ONCE
Status: DISCONTINUED | OUTPATIENT
Start: 2025-06-27 | End: 2025-06-27 | Stop reason: HOSPADM

## 2025-06-27 RX ORDER — SODIUM CHLORIDE 0.9 % (FLUSH) 0.9 %
10 SYRINGE (ML) INJECTION AS NEEDED
Status: DISCONTINUED | OUTPATIENT
Start: 2025-06-27 | End: 2025-06-27 | Stop reason: HOSPADM

## 2025-06-27 NOTE — ED PROVIDER NOTES
Subjective   History of Present Illness    Pt presents with chest pain. Onset about 2 hours ago of sharp midsternal chest pain radiating to the right arm and back.  Mild dyspnea.  No sweating, dizziness or nausea.  Feels like prior anginal pain. He took NTG x2 with relief.  Denies fever or cough or leg swelling.  Denies recent med change except treatment with diuretics about two weeks ago.    History provided by:  Patient      Review of Systems    Past Medical History:   Diagnosis Date    Bundle branch block, right     Cancer     Cardiomyopathy     Chest pain     CHF (congestive heart failure)     COPD (chronic obstructive pulmonary disease) 12/19/2016    Coronary artery disease     Heart attack 1993    History of shingles     Hyperlipidemia     Hypertension     Lung nodules 12/19/2016    Tremors of nervous system        Allergies   Allergen Reactions    Bactrim [Sulfamethoxazole-Trimethoprim]      UNKNOWN    Lopressor [Metoprolol Tartrate]      hypotension    Neosporin [Neomycin-Bacitracin Zn-Polymyx] Rash       Past Surgical History:   Procedure Laterality Date    BIVENTRICULLAR IMPLANTABLE CARDIOVERTER DEFIBRILLATOR PLACEMENT      CARDIAC CATHETERIZATION N/A 8/10/2016    Procedure: Left Heart Cath;  Surgeon: Prabhjot Lala MD;  Location:  [facility];  Service:     CARDIAC CATHETERIZATION N/A 11/13/2019    Procedure: Left Heart Cath;  Surgeon: Prabhjot Lala MD;  Location:  [facility];  Service: Cardiovascular    CARDIAC ELECTROPHYSIOLOGY PROCEDURE N/A 12/30/2016    Procedure: Implant ICD - bi ventricular;  Surgeon: Espinoza Benjamin DO;  Location:  [facility];  Service:     COLON SURGERY  06/26/2016    CORONARY ARTERY BYPASS GRAFT      LIPOMA EXCISION      abdomen    OTHER SURGICAL HISTORY      heart stents       Family History   Problem Relation Age of Onset    Heart attack Brother     No Known Problems Mother     No Known Problems Father      Clotting disorder Sister         clot traveled to heart       Social History     Socioeconomic History    Marital status:    Tobacco Use    Smoking status: Every Day     Current packs/day: 0.50     Types: Cigarettes    Smokeless tobacco: Never   Substance and Sexual Activity    Alcohol use: Yes     Comment: OCCASIONAL; liquor or beer only on occasion    Drug use: No    Sexual activity: Defer           Objective   Physical Exam  Vitals and nursing note reviewed.   Constitutional:       General: He is not in acute distress.     Appearance: Normal appearance. He is not ill-appearing.   HENT:      Head: Normocephalic and atraumatic.      Mouth/Throat:      Mouth: Mucous membranes are moist.   Eyes:      General: No scleral icterus.        Right eye: No discharge.         Left eye: No discharge.      Conjunctiva/sclera: Conjunctivae normal.   Cardiovascular:      Rate and Rhythm: Normal rate and regular rhythm.      Heart sounds: No murmur heard.  Pulmonary:      Effort: Pulmonary effort is normal. No respiratory distress.      Breath sounds: Normal breath sounds. No wheezing.   Abdominal:      General: Bowel sounds are normal. There is no distension.      Palpations: Abdomen is soft.      Tenderness: There is no abdominal tenderness. There is no guarding or rebound.   Musculoskeletal:         General: No swelling. Normal range of motion.      Cervical back: Normal range of motion and neck supple.      Right lower leg: No tenderness. No edema.      Left lower leg: No tenderness. No edema.   Skin:     General: Skin is warm and dry.      Findings: No rash.   Neurological:      General: No focal deficit present.      Mental Status: He is alert. Mental status is at baseline.   Psychiatric:         Mood and Affect: Mood normal.         Behavior: Behavior normal.         Thought Content: Thought content normal.         Procedures           ED Course    EKG read by me AV paced rhythm.    CXR read by me no acute  process.    Labs benign.    I reviewed outside records.  Heart cath 11/13/19 (most recent on file) showed patent grafts and a low EF less than 20%.  Echo on 8/3/22 shows global LV hypokinesis.    Two negative troponins here.  He remains pain free after the NTG he took at home.  He has no complaints and reports feeling normal.  We had a long discussion about test results and his medical history.  We discussed admission for consideration of provocative testing.  It appears to have been several years since last ischemic workup, though patient feels like Dai did a stress test in the office more recently than the records show (those records are not visible in Epic).  Given his history of CAD and symptoms today I think observation admission and consideration of intervention is appropriate.  He declined admission, says he would rather contact Dai's office and arrange office followup, says no cardiologist other than Dai has ever worked on him.  He did agree to return to the ED if his chest pain returns.  At the end of this shared decision making process I am respecting his right to choose outpatient care.                      HEART Score: 6   Shared Decision Making  I discussed the findings with the patient/patient representative who is in agreement with the treatment plan and the final disposition.  Risks and benefits of discharge and/or observation/admission were discussed: Yes                                      Medical Decision Making  Problems Addressed:  Chest pain, unspecified type: complicated acute illness or injury with systemic symptoms that poses a threat to life or bodily functions    Amount and/or Complexity of Data Reviewed  External Data Reviewed: notes.  Labs: ordered. Decision-making details documented in ED Course.  Radiology: ordered and independent interpretation performed. Decision-making details documented in ED Course.  ECG/medicine tests: ordered and independent interpretation  performed. Decision-making details documented in ED Course.    Risk  OTC drugs.  Prescription drug management.  Decision regarding hospitalization.        Final diagnoses:   Chest pain, unspecified type       ED Disposition  ED Disposition       ED Disposition   Discharge    Condition   Stable    Comment   --               Prabhjot Lala MD  [address]  [address] [address]  [phone]    Schedule an appointment as soon as possible for a visit       [facility] EMERGENCY DEPARTMENT  [address] [facility] [address] [phone]    If symptoms worsen         Medication List        Changed      clopidogrel 75 MG tablet  Commonly known as: PLAVIX  Take 1 tablet by mouth daily.  What changed: when to take this     spironolactone 25 MG tablet  Commonly known as: ALDACTONE  Take 1 tablet by mouth daily.  What changed: when to take this                 Mason Patricia MD  06/27/25 2022

## 2025-07-17 ENCOUNTER — TRANSCRIBE ORDERS (OUTPATIENT)
Dept: CARDIOLOGY | Facility: CLINIC | Age: 75
End: 2025-07-17
Payer: MEDICARE

## 2025-07-17 DIAGNOSIS — I50.9 HEART FAILURE, UNSPECIFIED HF CHRONICITY, UNSPECIFIED HEART FAILURE TYPE: ICD-10-CM

## 2025-07-30 ENCOUNTER — OFFICE VISIT (OUTPATIENT)
Dept: CARDIOLOGY | Facility: CLINIC | Age: 75
End: 2025-07-30
Payer: MEDICARE

## 2025-07-30 VITALS
DIASTOLIC BLOOD PRESSURE: 72 MMHG | WEIGHT: 200.8 LBS | OXYGEN SATURATION: 100 % | HEART RATE: 56 BPM | BODY MASS INDEX: 28.81 KG/M2 | SYSTOLIC BLOOD PRESSURE: 140 MMHG

## 2025-07-30 DIAGNOSIS — I10 ESSENTIAL HYPERTENSION: ICD-10-CM

## 2025-07-30 DIAGNOSIS — I25.10 CORONARY ARTERY DISEASE INVOLVING NATIVE CORONARY ARTERY OF NATIVE HEART WITHOUT ANGINA PECTORIS: Primary | ICD-10-CM

## 2025-07-30 DIAGNOSIS — I50.22 CHRONIC SYSTOLIC CONGESTIVE HEART FAILURE: ICD-10-CM

## 2025-07-30 DIAGNOSIS — E78.5 HYPERLIPIDEMIA LDL GOAL <70: ICD-10-CM

## 2025-07-30 DIAGNOSIS — Z72.0 TOBACCO ABUSE: ICD-10-CM

## 2025-07-30 RX ORDER — SACUBITRIL AND VALSARTAN 97; 103 MG/1; MG/1
1 TABLET, FILM COATED ORAL 2 TIMES DAILY
Qty: 180 TABLET | Refills: 3 | Status: SHIPPED | OUTPATIENT
Start: 2025-07-30

## 2025-07-30 RX ORDER — BUMETANIDE 1 MG/1
TABLET ORAL
COMMUNITY
Start: 2025-06-10

## 2025-07-30 RX ORDER — SOTAGLIFLOZIN 200 MG/1
TABLET ORAL
COMMUNITY

## 2025-07-30 RX ORDER — SPIRONOLACTONE 25 MG/1
50 TABLET ORAL EVERY MORNING
Qty: 180 TABLET | Refills: 3 | Status: SHIPPED | OUTPATIENT
Start: 2025-07-30

## 2025-07-30 RX ORDER — POTASSIUM CHLORIDE 1500 MG/1
TABLET, EXTENDED RELEASE ORAL
COMMUNITY
Start: 2025-06-10

## 2025-07-30 NOTE — ASSESSMENT & PLAN NOTE
Lipid abnormalities are improving with treatment    Plan:  Continue same medication/s without change.  Lovastatin 80 mg p.o. daily, ezetimibe 10 mg p.o. daily    Discussed medication dosage, use, side effects, and goals of treatment in detail.    Counseled patient on lifestyle modifications to help control hyperlipidemia.   Lab Results   Component Value Date    LDL 46 08/02/2022    LDL 86 04/27/2015    HDL 31 (L) 08/02/2022    HDL 37 (L) 04/27/2015    CHLPL 135 04/27/2015    TRIG 167 (H) 08/02/2022    TRIG 87 04/27/2015     Patient Treatment Goals:   LDL goal is less than 70; to target  Repeat lipid profile in 1 month with rest of labs    Followup in 6 months.

## 2025-07-30 NOTE — ASSESSMENT & PLAN NOTE
Coronary Artery Disease (OPTIONAL): Coronary artery disease is stable.  CCS 1-2 angina.  Active smoker.  Last cardiac cath done on 11/13/2019:  LM:   Patent stent  LAD: 100% occluded ostially  LCX: Stents  RCA: 100 Percent occluded proximally  LIMA to LAD: Widely patent with collateral flow to the RCA  SVG PDA: 100% occluded  Stress test in view of angina seen prior ER visit a month ago.  Continue current treatment regimen. Dietary sodium restriction. Regular aerobic exercise. Stop smoking.  Cardiac status will be reassessed in 3 months.

## 2025-07-30 NOTE — ASSESSMENT & PLAN NOTE
Congestive heart failure due to coronary artery disease (CAD).  Last echo done 8/2/2022 showing EF 20%, diffuse hypokinesis.  Heart failure is worsening.  NYHA Class III.  Repeat transthoracic echocardiogram.  Dietary sodium restriction.  Encouraged daily monitoring of the patient's weight.  Uptitrate spironolactone to 50 mg p.o. daily and discontinue potassium chloride.  Heart failure will be reassessed in 3 months, labs in 1 month.

## 2025-07-30 NOTE — PROGRESS NOTES
[facility]  [facility]  [address] [place] [address]  Dept: [phone]  Dept Fax: [phone]    Date: 07/30/2025  Patient: Cholo Lion  YOB: 1950    New Patient Office Note    Consult Reason:  Mr. Cholo Lion is a 75 y.o. male who presents to the clinic to [facility] care, seen for Chest Pain.     Subjective    History of Present Illness  The patient presents for evaluation of chest pain.    He reports feeling well overall, with no current chest discomfort. The last episode of chest pain occurred over a month ago. He has been under the care of Dr. Lala, a cardiologist, since early 2024. During his most recent hospital visit at [facility], he experienced chest pain, but no abnormalities were detected in his blood tests, ruling out a heart attack. No further episodes of chest pain have occurred since then. His current medication regimen includes daily aspirin, Bumex, and Entresto 97/103, which he has been taking for over a year. He also takes potassium supplements.    He has been smoking since the age of 14.    He has been experiencing leg swelling for approximately 10 months and does not use compression stockings.    SOCIAL HISTORY  Tobacco: The patient smokes cigarettes and has been smoking since the age of 14.      Past Medical History:   Diagnosis Date    Bundle branch block, right     Cancer     Cardiomyopathy     Chest pain     CHF (congestive heart failure)     COPD (chronic obstructive pulmonary disease) 12/19/2016    Coronary artery disease     Heart attack 1993    History of shingles     Hyperlipidemia     Hypertension     Lung nodules 12/19/2016    Tremors of nervous system        Past Surgical History:   Procedure Laterality Date    BIVENTRICULLAR IMPLANTABLE CARDIOVERTER DEFIBRILLATOR PLACEMENT      CARDIAC CATHETERIZATION N/A 8/10/2016    Procedure: Left Heart Cath;  Surgeon: Prabhjot Lala MD;  Location: [facility]  CATH INVASIVE LOCATION;  Service:     CARDIAC CATHETERIZATION N/A 11/13/2019    Procedure: Left Heart Cath;  Surgeon: Prabhjot Lala MD;  Location:  [facility] CATH INVASIVE LOCATION;  Service: Cardiovascular    CARDIAC ELECTROPHYSIOLOGY PROCEDURE N/A 12/30/2016    Procedure: Implant ICD - bi ventricular;  Surgeon: Espinoza Benjamin DO;  Location:  [facility] EP INVASIVE LOCATION;  Service:     COLON SURGERY  06/26/2016    CORONARY ARTERY BYPASS GRAFT      LIPOMA EXCISION      abdomen    OTHER SURGICAL HISTORY      heart stents       Family History   Problem Relation Age of Onset    Heart attack Brother     No Known Problems Mother     No Known Problems Father     Clotting disorder Sister         clot traveled to heart        Social History     Socioeconomic History    Marital status:    Tobacco Use    Smoking status: Every Day     Current packs/day: 0.50     Types: Cigarettes     Passive exposure: Never    Smokeless tobacco: Never   Vaping Use    Vaping status: Never Used   Substance and Sexual Activity    Alcohol use: Yes     Comment: OCCASIONAL; liquor or beer only on occasion    Drug use: No    Sexual activity: Defer       The following portions of the patient's history were reviewed and updated as appropriate: allergies, current medications, past family history, past medical history, past social history, past surgical history, and problem list.    Medications:   Allergies   Allergen Reactions    Bactrim [Sulfamethoxazole-Trimethoprim]      UNKNOWN    Lopressor [Metoprolol Tartrate]      hypotension    Neosporin [Neomycin-Bacitracin Zn-Polymyx] Rash      Current Outpatient Medications   Medication Instructions    aspirin 325 mg, Every Morning    atorvastatin (LIPITOR) 80 mg, Oral, Nightly    bumetanide (BUMEX) 1 MG tablet TAKE 1 TABLET BY MOUTH ONCE DAILY AS NEEDED FOR INCREASED EDEMA    carvedilol (COREG) 3.125 mg, Oral, Every 12 Hours Scheduled    clopidogrel (PLAVIX) 75 mg, Oral, Daily    ezetimibe (ZETIA)  10 mg, Daily    nitroglycerin (NITROSTAT) 0.4 mg, Sublingual, Every 5 Minutes PRN, Take no more than 3 doses in 15 minutes.    potassium chloride (KLOR-CON M20) 20 MEQ CR tablet TAKE 1 TABLET BY MOUTH ONCE DAILY AS NEEDED WHEN TAKING BUMEX    promethazine-dextromethorphan (PROMETHAZINE-DM) 6.25-15 MG/5ML syrup 5 mL, Oral, 4 Times Daily PRN    sacubitril-valsartan (Entresto)  MG tablet 1 tablet, Oral, 2 Times Daily    Sotagliflozin (Inpefa) 200 MG tablet Take  by mouth.    spironolactone (ALDACTONE) 50 mg, Oral, Every Morning       Objective  Vitals:    07/30/25 1111   BP: 140/72   BP Location: Right arm   Patient Position: Sitting   Cuff Size: Adult   Pulse: 56   SpO2: 100%   Weight: 91.1 kg (200 lb 12.8 oz)     Vitals:    07/30/25 1111   BP: 140/72   BP Location: Right arm   Patient Position: Sitting   Cuff Size: Adult   Pulse: 56   SpO2: 100%   Weight: 91.1 kg (200 lb 12.8 oz)        Physical Exam  Physical Exam         Labs:  Lab Results   Component Value Date     06/27/2025    K 4.7 06/27/2025     06/27/2025    CO2 24.8 06/27/2025    MG 2.1 01/04/2024    BUN 9.5 06/27/2025    CREATININE 1.13 06/27/2025    CALCIUM 8.9 06/27/2025    BILITOT 0.4 06/27/2025    ALKPHOS 95 06/27/2025    ALT 6 06/27/2025    AST 17 06/27/2025    GLUCOSE 102 (H) 06/27/2025    ALBUMIN 3.5 06/27/2025     Lab Results   Component Value Date    WBC 10.64 06/27/2025    HGB 13.3 06/27/2025    HCT 43.4 06/27/2025     06/27/2025     Lab Results   Component Value Date    INR 0.9 08/01/2022    INR 0.89 01/09/2017    INR 0.91 12/30/2016    PTT 29.6 08/01/2022    PTT 42.2 (H) 08/10/2016    PTT 32.5 (H) 08/10/2016     Lab Results   Component Value Date    TROPONINI 0.02 04/27/2015    TROPONINT 17 06/27/2025    TROPONINT 15 06/27/2025    TROPONINT 11 01/04/2024    .0 (H) 08/09/2016    BNP 63 04/26/2015     Lab Results   Component Value Date    .0 (H) 08/09/2016    PROBNP 1,497.0 (H) 06/27/2025       Lab Results    Component Value Date    CHLPL 135 04/27/2015    TRIG 167 (H) 08/02/2022    HDL 31 (L) 08/02/2022    LDL 46 08/02/2022     Lab Results   Component Value Date    TSH 2.080 01/04/2024    FREET4 1.01 01/04/2024       The ASCVD Risk score (Mary NORTON, et al., 2019) failed to calculate for the following reasons:    Risk score cannot be calculated because patient has a medical history suggesting prior/existing ASCVD     CV Diagnostics:  Procedures  CXR: Results for orders placed during the hospital encounter of 12/30/16    XR Chest PA & Lateral    Narrative  EXAMINATION: XR CHEST PA AND LATERAL - 12/31/2016    INDICATION: Pacemaker.    COMPARISON: 8/09/2016 portable chest radiograph.    FINDINGS: There is a left-sided triple-lead ICD. The heart is upper  normal size. The vasculature appears normal. There is mild basilar  pleural scarring and left apical scarring. Lungs otherwise appear clear.    Impression  ICD placement with no evidence of pneumothorax. No other new  chest disease is seen.    DICTATED:     12/31/2016  EDITED:          12/31/2016    This report was finalized on 12/31/2016 11:36 PM by DR. Real Way MD.     ECHO/MUGA: Results for orders placed during the hospital encounter of 08/01/22    Adult Transthoracic Echo Complete W/ Cont if Necessary Per Protocol (With Agitated Saline)    Interpretation Summary  · Saline test results are negative.  · The left ventricular cavity is dilated.  · Estimated right ventricular systolic pressure from tricuspid regurgitation is normal (<35 mmHg). Calculated right ventricular systolic pressure from tricuspid regurgitation is 25 mmHg.  · Global LV hypokinesis  · MAC with mild MR     STRESS TESTS: No results found for this or any previous visit.     CARDIAC CATH: Results for orders placed during the hospital encounter of 11/13/19    Cardiac Catheterization/Vascular Study    Narrative  Cardiac Catheterization    Referring Provider: PIOTR Noel    Indication(s) for  this Procedure: Unstable angina pectoris with abnormal stress test showing ischemia    Procedure(s) Performed: Left heart catheterization, coronary angiography, left ventriculography, saphenous vein graft angiography and internal mammary artery angiography    Description of the Procedure:  Informed consent was obtained.  A sheath was placed a left radial artery and selective angiography of the right left coronary arteries as well as left heart catheterization left ventriculography, saphenous vein graft angiography and internal mammary artery angiography was performed.  Procedure is terminated and the sheath was removed with the TR band and there were no early complications.      Angiographic Findings:  Coronary dominance, right  · LM:   Patent stent  · LAD: 100% occluded ostially  · LCX: Stents  · RCA: 100 Percent occluded proximally  · LIMA to LAD: Widely patent with collateral flow to the RCA  · SVG PDA: 100% occluded    LV: LVEF severe global left ventricle hypokinesis ejection fraction of less than 20%      Hemodynamic Findings:  Ao pressure: 110/70 mmHg  LVEDP: 13 mmHg      EBL: None  Specimen(s): None obtained    Complications: There were no early complications.    Final Impression(s):    Three-vessel CAD  Patent circumflex/LMCA stent  One patent bypass graft    Recommendations:    Medical management     DEVICES: No valid procedures specified.   HOLTER: No results found for this or any previous visit.     CT/MRI:  Results for orders placed during the hospital encounter of 04/26/15    CT ANGIOGRAM CHEST WITH AND WITHOUT CONTRAST    Narrative  Addendum created by Masoud Ching MD on 4/27/2015 2:08:51 AM EDT    Findings were discussed with Dr. Kinney at approximately 1:08 am on  4/27/15, central time.    Initial report created on 4/27/2015 2:00:05 AM EDT    EXAM:  CT Angiography Chest With Intravenous Contrast.    CLINICAL HISTORY:  64 years old, male; Chest painchief complaint:  Chest pain.  Shortness of breath.    This started just prior to arrival.   (2 hours  ago).   At its maximum,  severity described as moderate.   Modifying  factors- relieved by nitroglycerin (three).  (325 mg of asa.  No  relief).   It is described as pressure and sharp and it is described as  located in the central chest area and radiating to the right upper  back.   No nausea,  vomiting or diaphoresis.   (64 year old male  presents to the ed for evaluation of chest pain that onset two hours  ago while at home.  He has a history of last stent placed here on  8/22/14 with dr. Lala.  His pain was sudden onset today and similar  to the pain he has had in the past.  Associated symptoms include SOB,  but no fever,  chills,  vomiting,  nausea,  diarrhea,  leg edema,  or  chills.  He took asa and ntg pta without any relief of his pain.  The  patient is currently on plavix. ).   He has had difficulty breathing.    TECHNIQUE:  Axial computed tomographic angiography images of the chest with  intravenous contrast using pulmonary embolism protocol.  MIP reconstructed images were created and reviewed.  Sagittal reformatted images were created and reviewed.    CONTRAST:  100 mL of [id number] administered intravenously.    EXAM DATE/TIME:  Exam ordered 4/27/2015 12:01 AM    COMPARISON:  No relevant prior studies available.    FINDINGS:  Pulmonary arteries:  No pulmonary embolism identified.  Aorta:  No acute findings.  No thoracic aortic aneurysm.  Lungs:  Numerous bilateral pulmonary nodules.  The largest pulmonary  nodule appears in the left upper lobe measuring up to 0.6 cm(series 6,  image 27).  Pleural spaces:  No significant effusion.  No pneumothorax.  Heart:  No cardiomegaly.  No significant pericardial effusion.  Status post CABG  Bones:  No acute fracture.  Lymph nodes:  No enlarged mediastinal lymph nodes.    IMPRESSION-    1. No pulmonary embolism identified.  No focal airspace disease.    2. Numerous bilateral pulmonary nodules.  The largest pulmonary  nodule  appears in the left upper lobe measuring up to 0.6 cm(series 6, image  27).  Findings are highly suspicious for neoplastic process such as  pulmonary metastases.    Addendum Reading Radiologist- JANAE ALVARES  Addendum Releasing Radiologist- JANAE ALVARES  Addendum Released Date Time- 04/27/15 0209  Addendum - Y.S.  ------------------------------------------------------------------------------    EXAM:  CT Angiography Chest With Intravenous Contrast.    CLINICAL HISTORY:  64 years old, male; Chest painchief complaint:  Chest pain.  Shortness of breath.   This started just prior to arrival.   (2 hours  ago).   At its maximum,  severity described as moderate.   Modifying  factors- relieved by nitroglycerin (three).  (325 mg of asa.  No  relief).   It is described as pressure and sharp and it is described as  located in the central chest area and radiating to the right upper  back.   No nausea,  vomiting or diaphoresis.   (64 year old male  presents to the ed for evaluation of chest pain that onset two hours  ago while at home.  He has a history of last stent placed here on  8/22/14 with dr. Lala.  His pain was sudden onset today and similar  to the pain he has had in the past.  Associated symptoms include SOB,  but no fever,  chills,  vomiting,  nausea,  diarrhea,  leg edema,  or  chills.  He took asa and ntg pta without any relief of his pain.  The  patient is currently on plavix. ).   He has had difficulty breathing.    TECHNIQUE:  Axial computed tomographic angiography images of the chest with  intravenous contrast using pulmonary embolism protocol.  MIP reconstructed images were created and reviewed.  Sagittal reformatted images were created and reviewed.    CONTRAST:  100 mL of [id number] administered intravenously.    EXAM DATE/TIME:  Exam ordered 4/27/2015 12:01 AM    COMPARISON:  No relevant prior studies available.    FINDINGS:  Pulmonary arteries:  No pulmonary embolism  identified.  Aorta:  No acute findings.  No thoracic aortic aneurysm.  Lungs:  Numerous bilateral pulmonary nodules.  The largest pulmonary  nodule appears in the left upper lobe measuring up to 0.6 cm(series 6,  image 27).  Pleural spaces:  No significant effusion.  No pneumothorax.  Heart:  No cardiomegaly.  No significant pericardial effusion.  Status post CABG  Bones:  No acute fracture.  Lymph nodes:  No enlarged mediastinal lymph nodes.    IMPRESSION-    1. No pulmonary embolism identified.  No focal airspace disease.    2. Numerous bilateral pulmonary nodules.  The largest pulmonary nodule  appears in the left upper lobe measuring up to 0.6 cm(series 6, image  27).  Findings are highly suspicious for neoplastic process such as  pulmonary metastases.      Reading Radiologist- YOUNG SONG  Releasing Radiologist- YOUNG SONG  Released Date Time- 04/27/15 0200  Freddy BENITO  ------------------------------------------------------------------------------    VASCULAR: No valid procedures specified.     Assessment and Plan  Diagnoses and all orders for this visit:    1. Coronary artery disease involving native coronary artery of native heart without angina pectoris (Primary)  Assessment & Plan:  Coronary Artery Disease (OPTIONAL): Coronary artery disease is stable.  CCS 1-2 angina.  Active smoker.  Last cardiac cath done on 11/13/2019:  LM:   Patent stent  LAD: 100% occluded ostially  LCX: Stents  RCA: 100 Percent occluded proximally  LIMA to LAD: Widely patent with collateral flow to the RCA  SVG PDA: 100% occluded  Stress test in view of angina seen prior ER visit a month ago.  Continue current treatment regimen. Dietary sodium restriction. Regular aerobic exercise. Stop smoking.  Cardiac status will be reassessed in 3 months.    Orders:  -     Stress Test With Myocardial Perfusion One Day; Future  -     Adult Transthoracic Echo Complete W/ Cont if Necessary Per Protocol; Future    2. Chronic systolic  congestive heart failure  Assessment & Plan:  Congestive heart failure due to coronary artery disease (CAD).  Last echo done 8/2/2022 showing EF 20%, diffuse hypokinesis.  Heart failure is worsening.  NYHA Class III.  Repeat transthoracic echocardiogram.  Dietary sodium restriction.  Encouraged daily monitoring of the patient's weight.  Uptitrate spironolactone to 50 mg p.o. daily and discontinue potassium chloride.  Heart failure will be reassessed in 3 months, labs in 1 month.          3. Essential hypertension  Assessment & Plan:  Hypertension is uncontrolled.  Uptitrate spironolactone to 50 mg p.o. daily and discontinue potassium chloride.  Medication changes per orders.  Dietary sodium restriction.  Weight loss.  Regular aerobic exercise.  Stop smoking.  Ambulatory blood pressure monitoring.  Blood pressure will be reassessedin 3 months.      4. Hyperlipidemia LDL goal <70  Assessment & Plan:   Lipid abnormalities are improving with treatment    Plan:  Continue same medication/s without change.  Lovastatin 80 mg p.o. daily, ezetimibe 10 mg p.o. daily    Discussed medication dosage, use, side effects, and goals of treatment in detail.    Counseled patient on lifestyle modifications to help control hyperlipidemia.   Lab Results   Component Value Date    LDL 46 08/02/2022    LDL 86 04/27/2015    HDL 31 (L) 08/02/2022    HDL 37 (L) 04/27/2015    CHLPL 135 04/27/2015    TRIG 167 (H) 08/02/2022    TRIG 87 04/27/2015     Patient Treatment Goals:   LDL goal is less than 70; to target  Repeat lipid profile in 1 month with rest of labs    Followup in 6 months.      5. Tobacco abuse  Assessment & Plan:  Mr. Cholo Lion  reports that he has been smoking cigarettes. He has never used smokeless tobacco..  I have educated him on the risk of diseases from using tobacco products such as cancer, COPD, heart disease, and cataracts.   I advised him to quit and he is not willing to quit.  I spent 7 minutes counseling the  patient.         Other orders  -     sacubitril-valsartan (Entresto)  MG tablet; Take 1 tablet by mouth 2 (Two) Times a Day.  Dispense: 180 tablet; Refill: 3  -     spironolactone (ALDACTONE) 25 MG tablet; Take 2 tablets by mouth Every Morning.  Dispense: 180 tablet; Refill: 3         Return in about 3 months (around 10/30/2025) for Follow-up with Dr Rojas.    There are no Patient Instructions on file for this visit.    Patient or patient representative verbalized consent for the use of Ambient Listening during the visit with  Shahab Rojas MD for chart documentation. 7/30/2025  12:00 EDT    Shahab Rojas MD

## 2025-07-30 NOTE — ASSESSMENT & PLAN NOTE
Mr. Cholo Lion  reports that he has been smoking cigarettes. He has never used smokeless tobacco..  I have educated him on the risk of diseases from using tobacco products such as cancer, COPD, heart disease, and cataracts.   I advised him to quit and he is not willing to quit.  I spent 7 minutes counseling the patient.

## 2025-08-11 ENCOUNTER — RESULTS FOLLOW-UP (OUTPATIENT)
Dept: CARDIOLOGY | Facility: CLINIC | Age: 75
End: 2025-08-11

## 2025-08-21 ENCOUNTER — TELEPHONE (OUTPATIENT)
Dept: CARDIOLOGY | Facility: CLINIC | Age: 75
End: 2025-08-21
Payer: MEDICARE

## (undated) DEVICE — CATH DIAG EXPO .045 MPA2 5F 125CM

## (undated) DEVICE — GW INQWIRE FC PTFE STD J/1.5 .035 260

## (undated) DEVICE — MODEL AT P65, P/N 701554-001KIT CONTENTS: HAND CONTROLLER, 3-WAY HIGH-PRESSURE STOPCOCK WITH ROTATING END AND PREMIUM HIGH-PRESSURE TUBING: Brand: ANGIOTOUCH® KIT

## (undated) DEVICE — CATH DIAG EXPO .045 FL3  5F 100CM

## (undated) DEVICE — DEV COMP RAD PRELUDESYNC 24CM

## (undated) DEVICE — MODEL BT2000 P/N 700287-012KIT CONTENTS: MANIFOLD WITH SALINE AND CONTRAST PORTS, SALINE TUBING WITH SPIKE AND HAND SYRINGE, TRANSDUCER: Brand: BT2000 AUTOMATED MANIFOLD KIT

## (undated) DEVICE — CATH DIAG EXPO M/ PK 5F FL4/FR4 PIG

## (undated) DEVICE — INTRO SHEATH PRELUDE IDEAL SPRNG COIL 021 6F 23X80CM

## (undated) DEVICE — PK CATH CARD 10

## (undated) DEVICE — ANGIOGRAPHIC CATHETER: Brand: EXPO™